# Patient Record
Sex: MALE | Race: WHITE | NOT HISPANIC OR LATINO | Employment: OTHER | URBAN - NONMETROPOLITAN AREA
[De-identification: names, ages, dates, MRNs, and addresses within clinical notes are randomized per-mention and may not be internally consistent; named-entity substitution may affect disease eponyms.]

---

## 2022-09-03 ENCOUNTER — HOSPITAL ENCOUNTER (INPATIENT)
Facility: HOSPITAL | Age: 42
LOS: 5 days | Discharge: NURSING, REHAB OR LONG-TERM CARE | End: 2022-09-08
Attending: STUDENT IN AN ORGANIZED HEALTH CARE EDUCATION/TRAINING PROGRAM | Admitting: INTERNAL MEDICINE

## 2022-09-03 ENCOUNTER — APPOINTMENT (OUTPATIENT)
Dept: ULTRASOUND IMAGING | Facility: HOSPITAL | Age: 42
End: 2022-09-03

## 2022-09-03 ENCOUNTER — APPOINTMENT (OUTPATIENT)
Dept: CT IMAGING | Facility: HOSPITAL | Age: 42
End: 2022-09-03

## 2022-09-03 DIAGNOSIS — L02.415 CELLULITIS AND ABSCESS OF RIGHT LOWER EXTREMITY: ICD-10-CM

## 2022-09-03 DIAGNOSIS — L02.91 PURULENT ABSCESS: Primary | ICD-10-CM

## 2022-09-03 DIAGNOSIS — F19.90 IV DRUG USER: ICD-10-CM

## 2022-09-03 DIAGNOSIS — L03.115 CELLULITIS AND ABSCESS OF RIGHT LOWER EXTREMITY: ICD-10-CM

## 2022-09-03 DIAGNOSIS — L02.619 ABSCESS OF FOOT: ICD-10-CM

## 2022-09-03 DIAGNOSIS — R00.0 SINUS TACHYCARDIA: ICD-10-CM

## 2022-09-03 LAB
ABO GROUP BLD: NORMAL
ALBUMIN SERPL-MCNC: 2.79 G/DL (ref 3.5–5.2)
ALBUMIN/GLOB SERPL: 0.5 G/DL
ALP SERPL-CCNC: 161 U/L (ref 39–117)
ALT SERPL W P-5'-P-CCNC: 24 U/L (ref 1–41)
AMPHET+METHAMPHET UR QL: NEGATIVE
AMPHETAMINES UR QL: NEGATIVE
ANION GAP SERPL CALCULATED.3IONS-SCNC: 11.8 MMOL/L (ref 5–15)
ANION GAP SERPL CALCULATED.3IONS-SCNC: 14.8 MMOL/L (ref 5–15)
APTT PPP: 32.5 SECONDS (ref 26.5–34.5)
AST SERPL-CCNC: 57 U/L (ref 1–40)
BARBITURATES UR QL SCN: NEGATIVE
BASOPHILS # BLD AUTO: 0.07 10*3/MM3 (ref 0–0.2)
BASOPHILS NFR BLD AUTO: 0.5 % (ref 0–1.5)
BENZODIAZ UR QL SCN: NEGATIVE
BILIRUB SERPL-MCNC: 1.9 MG/DL (ref 0–1.2)
BLD GP AB SCN SERPL QL: NEGATIVE
BUN SERPL-MCNC: 17 MG/DL (ref 6–20)
BUN SERPL-MCNC: 17 MG/DL (ref 6–20)
BUN/CREAT SERPL: 21 (ref 7–25)
BUN/CREAT SERPL: 21.5 (ref 7–25)
BUPRENORPHINE SERPL-MCNC: NEGATIVE NG/ML
CALCIUM SPEC-SCNC: 9 MG/DL (ref 8.6–10.5)
CALCIUM SPEC-SCNC: 9.7 MG/DL (ref 8.6–10.5)
CANNABINOIDS SERPL QL: NEGATIVE
CHLORIDE SERPL-SCNC: 88 MMOL/L (ref 98–107)
CHLORIDE SERPL-SCNC: 91 MMOL/L (ref 98–107)
CO2 SERPL-SCNC: 24.2 MMOL/L (ref 22–29)
CO2 SERPL-SCNC: 25.2 MMOL/L (ref 22–29)
COCAINE UR QL: NEGATIVE
CREAT SERPL-MCNC: 0.79 MG/DL (ref 0.76–1.27)
CREAT SERPL-MCNC: 0.81 MG/DL (ref 0.76–1.27)
CRP SERPL-MCNC: 23.79 MG/DL (ref 0–0.5)
D-LACTATE SERPL-SCNC: 2 MMOL/L (ref 0.5–2)
DEPRECATED RDW RBC AUTO: 43.8 FL (ref 37–54)
EGFRCR SERPLBLD CKD-EPI 2021: 114.3 ML/MIN/1.73
EGFRCR SERPLBLD CKD-EPI 2021: 115.2 ML/MIN/1.73
EOSINOPHIL # BLD AUTO: 0.03 10*3/MM3 (ref 0–0.4)
EOSINOPHIL NFR BLD AUTO: 0.2 % (ref 0.3–6.2)
ERYTHROCYTE [DISTWIDTH] IN BLOOD BY AUTOMATED COUNT: 13.2 % (ref 12.3–15.4)
ERYTHROCYTE [SEDIMENTATION RATE] IN BLOOD: 128 MM/HR (ref 0–15)
ETHANOL BLD-MCNC: <10 MG/DL (ref 0–10)
ETHANOL UR QL: <0.01 %
FLUAV RNA RESP QL NAA+PROBE: NOT DETECTED
FLUBV RNA RESP QL NAA+PROBE: NOT DETECTED
GLOBULIN UR ELPH-MCNC: 5.2 GM/DL
GLUCOSE SERPL-MCNC: 109 MG/DL (ref 65–99)
GLUCOSE SERPL-MCNC: 110 MG/DL (ref 65–99)
HAV IGM SERPL QL IA: ABNORMAL
HBV CORE IGM SERPL QL IA: ABNORMAL
HBV SURFACE AG SERPL QL IA: ABNORMAL
HCT VFR BLD AUTO: 40.1 % (ref 37.5–51)
HCV AB SER DONR QL: REACTIVE
HGB BLD-MCNC: 13.8 G/DL (ref 13–17.7)
HIV1+2 AB SER QL: NORMAL
HOLD SPECIMEN: NORMAL
HOLD SPECIMEN: NORMAL
IMM GRANULOCYTES # BLD AUTO: 0.15 10*3/MM3 (ref 0–0.05)
IMM GRANULOCYTES NFR BLD AUTO: 1 % (ref 0–0.5)
INR PPP: 1.15 (ref 0.9–1.1)
LYMPHOCYTES # BLD AUTO: 1.87 10*3/MM3 (ref 0.7–3.1)
LYMPHOCYTES NFR BLD AUTO: 13 % (ref 19.6–45.3)
MCH RBC QN AUTO: 31.2 PG (ref 26.6–33)
MCHC RBC AUTO-ENTMCNC: 34.4 G/DL (ref 31.5–35.7)
MCV RBC AUTO: 90.7 FL (ref 79–97)
METHADONE UR QL SCN: NEGATIVE
MONOCYTES # BLD AUTO: 1.37 10*3/MM3 (ref 0.1–0.9)
MONOCYTES NFR BLD AUTO: 9.5 % (ref 5–12)
NEUTROPHILS NFR BLD AUTO: 10.87 10*3/MM3 (ref 1.7–7)
NEUTROPHILS NFR BLD AUTO: 75.8 % (ref 42.7–76)
NRBC BLD AUTO-RTO: 0 /100 WBC (ref 0–0.2)
OPIATES UR QL: NEGATIVE
OXYCODONE UR QL SCN: POSITIVE
PCP UR QL SCN: NEGATIVE
PLAT MORPH BLD: NORMAL
PLATELET # BLD AUTO: 289 10*3/MM3 (ref 140–450)
PMV BLD AUTO: 10.4 FL (ref 6–12)
POTASSIUM SERPL-SCNC: 3.3 MMOL/L (ref 3.5–5.2)
POTASSIUM SERPL-SCNC: 3.3 MMOL/L (ref 3.5–5.2)
PROPOXYPH UR QL: NEGATIVE
PROT SERPL-MCNC: 8 G/DL (ref 6–8.5)
PROTHROMBIN TIME: 15 SECONDS (ref 12.1–14.7)
RBC # BLD AUTO: 4.42 10*6/MM3 (ref 4.14–5.8)
RBC MORPH BLD: NORMAL
RH BLD: POSITIVE
SARS-COV-2 RNA RESP QL NAA+PROBE: NOT DETECTED
SODIUM SERPL-SCNC: 127 MMOL/L (ref 136–145)
SODIUM SERPL-SCNC: 128 MMOL/L (ref 136–145)
T&S EXPIRATION DATE: NORMAL
TRICYCLICS UR QL SCN: NEGATIVE
WBC NRBC COR # BLD: 14.36 10*3/MM3 (ref 3.4–10.8)

## 2022-09-03 PROCEDURE — 86901 BLOOD TYPING SEROLOGIC RH(D): CPT | Performed by: STUDENT IN AN ORGANIZED HEALTH CARE EDUCATION/TRAINING PROGRAM

## 2022-09-03 PROCEDURE — 99285 EMERGENCY DEPT VISIT HI MDM: CPT

## 2022-09-03 PROCEDURE — 84300 ASSAY OF URINE SODIUM: CPT | Performed by: HOSPITALIST

## 2022-09-03 PROCEDURE — 86140 C-REACTIVE PROTEIN: CPT | Performed by: STUDENT IN AN ORGANIZED HEALTH CARE EDUCATION/TRAINING PROGRAM

## 2022-09-03 PROCEDURE — 87205 SMEAR GRAM STAIN: CPT | Performed by: STUDENT IN AN ORGANIZED HEALTH CARE EDUCATION/TRAINING PROGRAM

## 2022-09-03 PROCEDURE — 87070 CULTURE OTHR SPECIMN AEROBIC: CPT | Performed by: STUDENT IN AN ORGANIZED HEALTH CARE EDUCATION/TRAINING PROGRAM

## 2022-09-03 PROCEDURE — 93926 LOWER EXTREMITY STUDY: CPT

## 2022-09-03 PROCEDURE — 85610 PROTHROMBIN TIME: CPT | Performed by: STUDENT IN AN ORGANIZED HEALTH CARE EDUCATION/TRAINING PROGRAM

## 2022-09-03 PROCEDURE — 86850 RBC ANTIBODY SCREEN: CPT | Performed by: STUDENT IN AN ORGANIZED HEALTH CARE EDUCATION/TRAINING PROGRAM

## 2022-09-03 PROCEDURE — 25010000002 TETANUS-DIPHTH-ACELL PERTUSSIS 5-2.5-18.5 LF-MCG/0.5 SUSPENSION PREFILLED SYRINGE: Performed by: STUDENT IN AN ORGANIZED HEALTH CARE EDUCATION/TRAINING PROGRAM

## 2022-09-03 PROCEDURE — G0432 EIA HIV-1/HIV-2 SCREEN: HCPCS | Performed by: HOSPITALIST

## 2022-09-03 PROCEDURE — 93005 ELECTROCARDIOGRAM TRACING: CPT | Performed by: STUDENT IN AN ORGANIZED HEALTH CARE EDUCATION/TRAINING PROGRAM

## 2022-09-03 PROCEDURE — 87147 CULTURE TYPE IMMUNOLOGIC: CPT | Performed by: STUDENT IN AN ORGANIZED HEALTH CARE EDUCATION/TRAINING PROGRAM

## 2022-09-03 PROCEDURE — 87040 BLOOD CULTURE FOR BACTERIA: CPT | Performed by: STUDENT IN AN ORGANIZED HEALTH CARE EDUCATION/TRAINING PROGRAM

## 2022-09-03 PROCEDURE — 86900 BLOOD TYPING SEROLOGIC ABO: CPT | Performed by: STUDENT IN AN ORGANIZED HEALTH CARE EDUCATION/TRAINING PROGRAM

## 2022-09-03 PROCEDURE — 85025 COMPLETE CBC W/AUTO DIFF WBC: CPT | Performed by: STUDENT IN AN ORGANIZED HEALTH CARE EDUCATION/TRAINING PROGRAM

## 2022-09-03 PROCEDURE — 87186 SC STD MICRODIL/AGAR DIL: CPT | Performed by: STUDENT IN AN ORGANIZED HEALTH CARE EDUCATION/TRAINING PROGRAM

## 2022-09-03 PROCEDURE — 87636 SARSCOV2 & INF A&B AMP PRB: CPT | Performed by: STUDENT IN AN ORGANIZED HEALTH CARE EDUCATION/TRAINING PROGRAM

## 2022-09-03 PROCEDURE — 83735 ASSAY OF MAGNESIUM: CPT | Performed by: HOSPITALIST

## 2022-09-03 PROCEDURE — 25010000002 IOPAMIDOL 61 % SOLUTION: Performed by: STUDENT IN AN ORGANIZED HEALTH CARE EDUCATION/TRAINING PROGRAM

## 2022-09-03 PROCEDURE — 80074 ACUTE HEPATITIS PANEL: CPT | Performed by: HOSPITALIST

## 2022-09-03 PROCEDURE — 93010 ELECTROCARDIOGRAM REPORT: CPT | Performed by: INTERNAL MEDICINE

## 2022-09-03 PROCEDURE — 80306 DRUG TEST PRSMV INSTRMNT: CPT | Performed by: STUDENT IN AN ORGANIZED HEALTH CARE EDUCATION/TRAINING PROGRAM

## 2022-09-03 PROCEDURE — 87150 DNA/RNA AMPLIFIED PROBE: CPT | Performed by: STUDENT IN AN ORGANIZED HEALTH CARE EDUCATION/TRAINING PROGRAM

## 2022-09-03 PROCEDURE — 85652 RBC SED RATE AUTOMATED: CPT | Performed by: STUDENT IN AN ORGANIZED HEALTH CARE EDUCATION/TRAINING PROGRAM

## 2022-09-03 PROCEDURE — 99223 1ST HOSP IP/OBS HIGH 75: CPT | Performed by: HOSPITALIST

## 2022-09-03 PROCEDURE — 83605 ASSAY OF LACTIC ACID: CPT | Performed by: STUDENT IN AN ORGANIZED HEALTH CARE EDUCATION/TRAINING PROGRAM

## 2022-09-03 PROCEDURE — 90715 TDAP VACCINE 7 YRS/> IM: CPT | Performed by: STUDENT IN AN ORGANIZED HEALTH CARE EDUCATION/TRAINING PROGRAM

## 2022-09-03 PROCEDURE — 25010000002 PIPERACILLIN SOD-TAZOBACTAM PER 1 G: Performed by: STUDENT IN AN ORGANIZED HEALTH CARE EDUCATION/TRAINING PROGRAM

## 2022-09-03 PROCEDURE — 90471 IMMUNIZATION ADMIN: CPT | Performed by: STUDENT IN AN ORGANIZED HEALTH CARE EDUCATION/TRAINING PROGRAM

## 2022-09-03 PROCEDURE — 36415 COLL VENOUS BLD VENIPUNCTURE: CPT

## 2022-09-03 PROCEDURE — 73701 CT LOWER EXTREMITY W/DYE: CPT

## 2022-09-03 PROCEDURE — 80053 COMPREHEN METABOLIC PANEL: CPT | Performed by: STUDENT IN AN ORGANIZED HEALTH CARE EDUCATION/TRAINING PROGRAM

## 2022-09-03 PROCEDURE — 99284 EMERGENCY DEPT VISIT MOD MDM: CPT

## 2022-09-03 PROCEDURE — 82077 ASSAY SPEC XCP UR&BREATH IA: CPT | Performed by: STUDENT IN AN ORGANIZED HEALTH CARE EDUCATION/TRAINING PROGRAM

## 2022-09-03 PROCEDURE — 85007 BL SMEAR W/DIFF WBC COUNT: CPT | Performed by: STUDENT IN AN ORGANIZED HEALTH CARE EDUCATION/TRAINING PROGRAM

## 2022-09-03 PROCEDURE — 25010000002 VANCOMYCIN 1 G RECONSTITUTED SOLUTION 1 EACH VIAL: Performed by: STUDENT IN AN ORGANIZED HEALTH CARE EDUCATION/TRAINING PROGRAM

## 2022-09-03 PROCEDURE — 85730 THROMBOPLASTIN TIME PARTIAL: CPT | Performed by: STUDENT IN AN ORGANIZED HEALTH CARE EDUCATION/TRAINING PROGRAM

## 2022-09-03 RX ORDER — POTASSIUM CHLORIDE 1500 MG/1
40 TABLET, FILM COATED, EXTENDED RELEASE ORAL AS NEEDED
Status: DISCONTINUED | OUTPATIENT
Start: 2022-09-03 | End: 2022-09-08 | Stop reason: HOSPADM

## 2022-09-03 RX ORDER — SODIUM CHLORIDE 9 MG/ML
100 INJECTION, SOLUTION INTRAVENOUS CONTINUOUS
Status: DISCONTINUED | OUTPATIENT
Start: 2022-09-03 | End: 2022-09-03

## 2022-09-03 RX ORDER — NICOTINE 21 MG/24HR
1 PATCH, TRANSDERMAL 24 HOURS TRANSDERMAL
Status: DISCONTINUED | OUTPATIENT
Start: 2022-09-04 | End: 2022-09-08 | Stop reason: HOSPADM

## 2022-09-03 RX ORDER — MORPHINE SULFATE 2 MG/ML
1 INJECTION, SOLUTION INTRAMUSCULAR; INTRAVENOUS EVERY 4 HOURS PRN
Status: DISCONTINUED | OUTPATIENT
Start: 2022-09-03 | End: 2022-09-08 | Stop reason: HOSPADM

## 2022-09-03 RX ORDER — POTASSIUM CHLORIDE 1.5 G/1.77G
40 POWDER, FOR SOLUTION ORAL AS NEEDED
Status: DISCONTINUED | OUTPATIENT
Start: 2022-09-03 | End: 2022-09-08 | Stop reason: HOSPADM

## 2022-09-03 RX ORDER — SODIUM CHLORIDE 0.9 % (FLUSH) 0.9 %
10 SYRINGE (ML) INJECTION AS NEEDED
Status: DISCONTINUED | OUTPATIENT
Start: 2022-09-03 | End: 2022-09-08 | Stop reason: HOSPADM

## 2022-09-03 RX ADMIN — VANCOMYCIN HYDROCHLORIDE 1 G: 1 INJECTION, POWDER, LYOPHILIZED, FOR SOLUTION INTRAVENOUS at 18:15

## 2022-09-03 RX ADMIN — IOPAMIDOL 85 ML: 612 INJECTION, SOLUTION INTRAVENOUS at 19:42

## 2022-09-03 RX ADMIN — PIPERACILLIN SODIUM AND TAZOBACTAM SODIUM 3.38 G: 3; .375 INJECTION, POWDER, LYOPHILIZED, FOR SOLUTION INTRAVENOUS at 18:00

## 2022-09-03 RX ADMIN — TETANUS TOXOID, REDUCED DIPHTHERIA TOXOID AND ACELLULAR PERTUSSIS VACCINE, ADSORBED 0.5 ML: 5; 2.5; 8; 8; 2.5 SUSPENSION INTRAMUSCULAR at 18:20

## 2022-09-03 RX ADMIN — SODIUM CHLORIDE, POTASSIUM CHLORIDE, SODIUM LACTATE AND CALCIUM CHLORIDE 1000 ML: 600; 310; 30; 20 INJECTION, SOLUTION INTRAVENOUS at 17:21
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
## 2022-09-04 ENCOUNTER — APPOINTMENT (OUTPATIENT)
Dept: CARDIOLOGY | Facility: HOSPITAL | Age: 42
End: 2022-09-04

## 2022-09-04 LAB
ABO GROUP BLD: NORMAL
ALBUMIN SERPL-MCNC: 1.86 G/DL (ref 3.5–5.2)
ALBUMIN/GLOB SERPL: 0.4 G/DL
ALP SERPL-CCNC: 161 U/L (ref 39–117)
ALT SERPL W P-5'-P-CCNC: 18 U/L (ref 1–41)
ANION GAP SERPL CALCULATED.3IONS-SCNC: 13.6 MMOL/L (ref 5–15)
ANION GAP SERPL CALCULATED.3IONS-SCNC: 7.9 MMOL/L (ref 5–15)
AST SERPL-CCNC: 38 U/L (ref 1–40)
BACTERIA BLD CULT: ABNORMAL
BASOPHILS # BLD AUTO: 0.04 10*3/MM3 (ref 0–0.2)
BASOPHILS NFR BLD AUTO: 0.3 % (ref 0–1.5)
BH CV ECHO MEAS - ACS: 2.4 CM
BH CV ECHO MEAS - AO MAX PG: 4.8 MMHG
BH CV ECHO MEAS - AO MEAN PG: 2 MMHG
BH CV ECHO MEAS - AO ROOT DIAM: 3.2 CM
BH CV ECHO MEAS - AO V2 MAX: 109 CM/SEC
BH CV ECHO MEAS - AO V2 VTI: 22.1 CM
BH CV ECHO MEAS - EDV(CUBED): 161 ML
BH CV ECHO MEAS - EDV(MOD-SP4): 81.3 ML
BH CV ECHO MEAS - EF(MOD-SP4): 49.9 %
BH CV ECHO MEAS - ESV(CUBED): 56.2 ML
BH CV ECHO MEAS - ESV(MOD-SP4): 40.7 ML
BH CV ECHO MEAS - FS: 29.6 %
BH CV ECHO MEAS - IVS/LVPW: 0.72 CM
BH CV ECHO MEAS - IVSD: 0.68 CM
BH CV ECHO MEAS - LA DIMENSION: 2.9 CM
BH CV ECHO MEAS - LAT PEAK E' VEL: 15 CM/SEC
BH CV ECHO MEAS - LV MASS(C)D: 161.6 GRAMS
BH CV ECHO MEAS - LVIDD: 5.4 CM
BH CV ECHO MEAS - LVIDS: 3.8 CM
BH CV ECHO MEAS - LVOT AREA: 4.2 CM2
BH CV ECHO MEAS - LVOT DIAM: 2.3 CM
BH CV ECHO MEAS - LVPWD: 0.95 CM
BH CV ECHO MEAS - MED PEAK E' VEL: 10.9 CM/SEC
BH CV ECHO MEAS - MV A MAX VEL: 69.2 CM/SEC
BH CV ECHO MEAS - MV E MAX VEL: 64.9 CM/SEC
BH CV ECHO MEAS - MV E/A: 0.94
BH CV ECHO MEAS - PA ACC TIME: 0.1 SEC
BH CV ECHO MEAS - PA PR(ACCEL): 34.4 MMHG
BH CV ECHO MEAS - RAP SYSTOLE: 10 MMHG
BH CV ECHO MEAS - RVSP: 32.1 MMHG
BH CV ECHO MEAS - SV(MOD-SP4): 40.6 ML
BH CV ECHO MEAS - TAPSE (>1.6): 2.7 CM
BH CV ECHO MEAS - TR MAX PG: 22.1 MMHG
BH CV ECHO MEAS - TR MAX VEL: 235 CM/SEC
BH CV ECHO MEASUREMENTS AVERAGE E/E' RATIO: 5.01
BILIRUB SERPL-MCNC: 1.5 MG/DL (ref 0–1.2)
BOTTLE TYPE: ABNORMAL
BUN SERPL-MCNC: 16 MG/DL (ref 6–20)
BUN SERPL-MCNC: 17 MG/DL (ref 6–20)
BUN/CREAT SERPL: 18.6 (ref 7–25)
BUN/CREAT SERPL: 20 (ref 7–25)
CALCIUM SPEC-SCNC: 8.7 MG/DL (ref 8.6–10.5)
CALCIUM SPEC-SCNC: 9.1 MG/DL (ref 8.6–10.5)
CHLORIDE SERPL-SCNC: 91 MMOL/L (ref 98–107)
CHLORIDE SERPL-SCNC: 97 MMOL/L (ref 98–107)
CO2 SERPL-SCNC: 26.1 MMOL/L (ref 22–29)
CO2 SERPL-SCNC: 26.4 MMOL/L (ref 22–29)
CREAT SERPL-MCNC: 0.85 MG/DL (ref 0.76–1.27)
CREAT SERPL-MCNC: 0.86 MG/DL (ref 0.76–1.27)
CRP SERPL-MCNC: 16.74 MG/DL (ref 0–0.5)
DEPRECATED RDW RBC AUTO: 44 FL (ref 37–54)
EGFRCR SERPLBLD CKD-EPI 2021: 112.3 ML/MIN/1.73
EGFRCR SERPLBLD CKD-EPI 2021: 112.7 ML/MIN/1.73
EOSINOPHIL # BLD AUTO: 0.03 10*3/MM3 (ref 0–0.4)
EOSINOPHIL NFR BLD AUTO: 0.2 % (ref 0.3–6.2)
ERYTHROCYTE [DISTWIDTH] IN BLOOD BY AUTOMATED COUNT: 13.2 % (ref 12.3–15.4)
GLOBULIN UR ELPH-MCNC: 4.9 GM/DL
GLUCOSE SERPL-MCNC: 107 MG/DL (ref 65–99)
GLUCOSE SERPL-MCNC: 111 MG/DL (ref 65–99)
HCT VFR BLD AUTO: 39.3 % (ref 37.5–51)
HGB BLD-MCNC: 13.5 G/DL (ref 13–17.7)
IMM GRANULOCYTES # BLD AUTO: 0.17 10*3/MM3 (ref 0–0.05)
IMM GRANULOCYTES NFR BLD AUTO: 1.2 % (ref 0–0.5)
LEFT ATRIUM VOLUME INDEX: 13 ML/M2
LV EF 2D ECHO EST: 65 %
LYMPHOCYTES # BLD AUTO: 2.13 10*3/MM3 (ref 0.7–3.1)
LYMPHOCYTES NFR BLD AUTO: 14.8 % (ref 19.6–45.3)
MAGNESIUM SERPL-MCNC: 1.9 MG/DL (ref 1.6–2.6)
MAXIMAL PREDICTED HEART RATE: 180 BPM
MCH RBC QN AUTO: 31 PG (ref 26.6–33)
MCHC RBC AUTO-ENTMCNC: 34.4 G/DL (ref 31.5–35.7)
MCV RBC AUTO: 90.3 FL (ref 79–97)
MONOCYTES # BLD AUTO: 1.32 10*3/MM3 (ref 0.1–0.9)
MONOCYTES NFR BLD AUTO: 9.2 % (ref 5–12)
MRSA DNA SPEC QL NAA+PROBE: POSITIVE
NEUTROPHILS NFR BLD AUTO: 10.68 10*3/MM3 (ref 1.7–7)
NEUTROPHILS NFR BLD AUTO: 74.3 % (ref 42.7–76)
NRBC BLD AUTO-RTO: 0 /100 WBC (ref 0–0.2)
PLAT MORPH BLD: NORMAL
PLATELET # BLD AUTO: 287 10*3/MM3 (ref 140–450)
PMV BLD AUTO: 10.5 FL (ref 6–12)
POTASSIUM SERPL-SCNC: 3.1 MMOL/L (ref 3.5–5.2)
POTASSIUM SERPL-SCNC: 4 MMOL/L (ref 3.5–5.2)
POTASSIUM SERPL-SCNC: 4 MMOL/L (ref 3.5–5.2)
PROT SERPL-MCNC: 6.8 G/DL (ref 6–8.5)
QT INTERVAL: 378 MS
QTC INTERVAL: 487 MS
RBC # BLD AUTO: 4.35 10*6/MM3 (ref 4.14–5.8)
RBC MORPH BLD: NORMAL
RH BLD: POSITIVE
S AUREUS DNA SPEC QL NAA+PROBE: POSITIVE
SODIUM SERPL-SCNC: 131 MMOL/L (ref 136–145)
SODIUM SERPL-SCNC: 131 MMOL/L (ref 136–145)
SODIUM UR-SCNC: <20 MMOL/L
STRESS TARGET HR: 153 BPM
WBC NRBC COR # BLD: 14.37 10*3/MM3 (ref 3.4–10.8)

## 2022-09-04 PROCEDURE — 87640 STAPH A DNA AMP PROBE: CPT | Performed by: HOSPITALIST

## 2022-09-04 PROCEDURE — 87147 CULTURE TYPE IMMUNOLOGIC: CPT | Performed by: NURSE PRACTITIONER

## 2022-09-04 PROCEDURE — 93306 TTE W/DOPPLER COMPLETE: CPT | Performed by: INTERNAL MEDICINE

## 2022-09-04 PROCEDURE — 87641 MR-STAPH DNA AMP PROBE: CPT | Performed by: HOSPITALIST

## 2022-09-04 PROCEDURE — 25010000002 MORPHINE PER 10 MG: Performed by: HOSPITALIST

## 2022-09-04 PROCEDURE — 25010000002 HEPARIN (PORCINE) PER 1000 UNITS: Performed by: HOSPITALIST

## 2022-09-04 PROCEDURE — 99232 SBSQ HOSP IP/OBS MODERATE 35: CPT | Performed by: HOSPITALIST

## 2022-09-04 PROCEDURE — 93306 TTE W/DOPPLER COMPLETE: CPT

## 2022-09-04 PROCEDURE — 85007 BL SMEAR W/DIFF WBC COUNT: CPT | Performed by: HOSPITALIST

## 2022-09-04 PROCEDURE — 84132 ASSAY OF SERUM POTASSIUM: CPT | Performed by: HOSPITALIST

## 2022-09-04 PROCEDURE — 99221 1ST HOSP IP/OBS SF/LOW 40: CPT | Performed by: SURGERY

## 2022-09-04 PROCEDURE — 80053 COMPREHEN METABOLIC PANEL: CPT | Performed by: HOSPITALIST

## 2022-09-04 PROCEDURE — 85025 COMPLETE CBC W/AUTO DIFF WBC: CPT | Performed by: HOSPITALIST

## 2022-09-04 PROCEDURE — 86900 BLOOD TYPING SEROLOGIC ABO: CPT

## 2022-09-04 PROCEDURE — 25010000002 VANCOMYCIN 1 G RECONSTITUTED SOLUTION 1 EACH VIAL: Performed by: HOSPITALIST

## 2022-09-04 PROCEDURE — 25010000002 PIPERACILLIN SOD-TAZOBACTAM PER 1 G: Performed by: HOSPITALIST

## 2022-09-04 PROCEDURE — 87040 BLOOD CULTURE FOR BACTERIA: CPT | Performed by: NURSE PRACTITIONER

## 2022-09-04 PROCEDURE — 86140 C-REACTIVE PROTEIN: CPT | Performed by: HOSPITALIST

## 2022-09-04 PROCEDURE — 86901 BLOOD TYPING SEROLOGIC RH(D): CPT

## 2022-09-04 PROCEDURE — 99221 1ST HOSP IP/OBS SF/LOW 40: CPT | Performed by: NURSE PRACTITIONER

## 2022-09-04 RX ORDER — POTASSIUM CHLORIDE 20 MEQ/1
40 TABLET, EXTENDED RELEASE ORAL EVERY 4 HOURS
Status: COMPLETED | OUTPATIENT
Start: 2022-09-04 | End: 2022-09-04

## 2022-09-04 RX ORDER — HYDROXYZINE HYDROCHLORIDE 25 MG/1
25 TABLET, FILM COATED ORAL ONCE
Status: COMPLETED | OUTPATIENT
Start: 2022-09-04 | End: 2022-09-04

## 2022-09-04 RX ORDER — SODIUM CHLORIDE 0.9 % (FLUSH) 0.9 %
10 SYRINGE (ML) INJECTION AS NEEDED
Status: DISCONTINUED | OUTPATIENT
Start: 2022-09-04 | End: 2022-09-08 | Stop reason: HOSPADM

## 2022-09-04 RX ORDER — SODIUM CHLORIDE 9 MG/ML
125 INJECTION, SOLUTION INTRAVENOUS CONTINUOUS
Status: DISCONTINUED | OUTPATIENT
Start: 2022-09-04 | End: 2022-09-08 | Stop reason: HOSPADM

## 2022-09-04 RX ORDER — SODIUM CHLORIDE 0.9 % (FLUSH) 0.9 %
10 SYRINGE (ML) INJECTION EVERY 12 HOURS SCHEDULED
Status: DISCONTINUED | OUTPATIENT
Start: 2022-09-04 | End: 2022-09-08 | Stop reason: HOSPADM

## 2022-09-04 RX ORDER — HEPARIN SODIUM 5000 [USP'U]/ML
5000 INJECTION, SOLUTION INTRAVENOUS; SUBCUTANEOUS EVERY 12 HOURS SCHEDULED
Status: DISCONTINUED | OUTPATIENT
Start: 2022-09-04 | End: 2022-09-08 | Stop reason: HOSPADM

## 2022-09-04 RX ADMIN — NICOTINE 1 PATCH: 14 PATCH TRANSDERMAL at 00:43

## 2022-09-04 RX ADMIN — Medication 10 ML: at 09:00

## 2022-09-04 RX ADMIN — VANCOMYCIN HYDROCHLORIDE 1000 MG: 1 INJECTION, POWDER, LYOPHILIZED, FOR SOLUTION INTRAVENOUS at 17:37

## 2022-09-04 RX ADMIN — MORPHINE SULFATE 1 MG: 2 INJECTION, SOLUTION INTRAMUSCULAR; INTRAVENOUS at 05:26

## 2022-09-04 RX ADMIN — POTASSIUM CHLORIDE 40 MEQ: 20 TABLET, EXTENDED RELEASE ORAL at 14:06

## 2022-09-04 RX ADMIN — VANCOMYCIN HYDROCHLORIDE 1000 MG: 1 INJECTION, POWDER, LYOPHILIZED, FOR SOLUTION INTRAVENOUS at 10:09

## 2022-09-04 RX ADMIN — HEPARIN SODIUM 5000 UNITS: 5000 INJECTION INTRAVENOUS; SUBCUTANEOUS at 00:42

## 2022-09-04 RX ADMIN — Medication 10 ML: at 00:42

## 2022-09-04 RX ADMIN — MORPHINE SULFATE 1 MG: 2 INJECTION, SOLUTION INTRAMUSCULAR; INTRAVENOUS at 21:37

## 2022-09-04 RX ADMIN — SODIUM CHLORIDE 125 ML/HR: 9 INJECTION, SOLUTION INTRAVENOUS at 17:50

## 2022-09-04 RX ADMIN — HEPARIN SODIUM 5000 UNITS: 5000 INJECTION INTRAVENOUS; SUBCUTANEOUS at 21:36

## 2022-09-04 RX ADMIN — PIPERACILLIN SODIUM AND TAZOBACTAM SODIUM 3.38 G: 3; .375 INJECTION, POWDER, LYOPHILIZED, FOR SOLUTION INTRAVENOUS at 02:35

## 2022-09-04 RX ADMIN — POTASSIUM CHLORIDE 40 MEQ: 20 TABLET, EXTENDED RELEASE ORAL at 05:11

## 2022-09-04 RX ADMIN — Medication 10 ML: at 21:36

## 2022-09-04 RX ADMIN — MORPHINE SULFATE 1 MG: 2 INJECTION, SOLUTION INTRAMUSCULAR; INTRAVENOUS at 00:42

## 2022-09-04 RX ADMIN — HYDROXYZINE HYDROCHLORIDE 25 MG: 25 TABLET ORAL at 21:36

## 2022-09-04 RX ADMIN — ETHYL ALCOHOL 1 EACH: 62 SWAB TOPICAL at 21:37

## 2022-09-04 RX ADMIN — VANCOMYCIN HYDROCHLORIDE 1000 MG: 1 INJECTION, POWDER, LYOPHILIZED, FOR SOLUTION INTRAVENOUS at 01:28

## 2022-09-04 RX ADMIN — POTASSIUM CHLORIDE 40 MEQ: 20 TABLET, EXTENDED RELEASE ORAL at 10:09

## 2022-09-04 RX ADMIN — SODIUM CHLORIDE 125 ML/HR: 9 INJECTION, SOLUTION INTRAVENOUS at 05:11

## 2022-09-04 RX ADMIN — MORPHINE SULFATE 1 MG: 2 INJECTION, SOLUTION INTRAMUSCULAR; INTRAVENOUS at 10:21

## 2022-09-04 RX ADMIN — PIPERACILLIN SODIUM AND TAZOBACTAM SODIUM 3.38 G: 3; .375 INJECTION, POWDER, LYOPHILIZED, FOR SOLUTION INTRAVENOUS at 17:35

## 2022-09-04 RX ADMIN — PIPERACILLIN SODIUM AND TAZOBACTAM SODIUM 3.38 G: 3; .375 INJECTION, POWDER, LYOPHILIZED, FOR SOLUTION INTRAVENOUS at 09:00

## 2022-09-04 RX ADMIN — MORPHINE SULFATE 1 MG: 2 INJECTION, SOLUTION INTRAMUSCULAR; INTRAVENOUS at 14:06

## 2022-09-04 RX ADMIN — MORPHINE SULFATE 1 MG: 2 INJECTION, SOLUTION INTRAMUSCULAR; INTRAVENOUS at 18:01
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
## 2022-09-04 NOTE — PROGRESS NOTES
Pharmacy was consulted to dose zosyn and vancomycin for sepsis and a skin and soft tissue infection. Based on an estimated CrCl of greater than 120mL/min, an extended infusion zosyn dose of 3.375g q8hr and vancomycin 1g q8hr has been ordered. A vancomycin trough will be obtained at steady state to determine if we are within the target therapeutic AUC concentration of 400-600mg/L.hr. Thank you for the consult. Pharmacy will continue to follow.     Thank you,   Tara Rodriguez, PharmD  00:55 EDT
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
## 2022-09-04 NOTE — PLAN OF CARE
Goal Outcome Evaluation:  Plan of Care Reviewed With: patient        Progress: no change  Outcome Evaluation: Pt admitted this shift. PRN pain meds given. Cont IV abx. Potassium 3.1- replacement dose requested. No other acute changes noted. Will continue POC
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
## 2022-09-04 NOTE — PLAN OF CARE
Goal Outcome Evaluation:  Plan of Care Reviewed With: patient        Progress: no change  Outcome Evaluation: Patient's VSS. Pt requested PRN pain medication throughout the shift. Pt advanced from NPO to regular diet, but will be NPO at midnight. Patient voiced no further concerns at this time.

## 2022-09-04 NOTE — PAYOR COMM NOTE
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 were made to your prescriptions during this visit.          Salina Strickland DO  09/03/22 2345      Electronically signed by Salina Strickland DO at 09/03/22 2345         Current Facility-Administered Medications   Medication Dose Route Frequency Provider Last Rate Last Admin   • heparin (porcine) 5000 UNIT/ML injection 5,000 Units  5,000 Units Subcutaneous Q12H Timbo Arellano MD   5,000 Units at 09/04/22 0042   • morphine injection 1 mg  1 mg Intravenous Q4H PRN Timbo Arellano MD   1 mg at 09/04/22 1021   • nicotine (NICODERM CQ) 14 MG/24HR patch 1 patch  1 patch Transdermal Q24H Timbo Arellano MD   1 patch at 09/04/22 0043   • piperacillin-tazobactam (ZOSYN) IVPB 3.375 g in 100 mL NS VTB  3.375 g Intravenous Q8H Timbo Arellano MD   3.375 g at 09/04/22 0900   • potassium chloride (K-DUR,KLOR-CON) CR tablet 40 mEq  40 mEq Oral Q4H Timbo Arellano MD   40 mEq at 09/04/22 1009   • potassium chloride (K-DUR,KLOR-CON) ER tablet 40 mEq  40 mEq Oral PRN Timbo Arellano MD       • potassium chloride (KLOR-CON) packet 40 mEq  40 mEq Oral PRN Timbo Arellano MD       • sodium chloride 0.9 % flush 10  mL  10 mL Intravenous PRN Timbo Green MD       • sodium chloride 0.9 % flush 10 mL  10 mL Intravenous Q12H Timbo Green MD   10 mL at 09/04/22 0900   • sodium chloride 0.9 % flush 10 mL  10 mL Intravenous PRN Timbo Green MD       • sodium chloride 0.9 % infusion  125 mL/hr Intravenous Continuous Timbo Green  mL/hr at 09/04/22 0511 125 mL/hr at 09/04/22 0511   • vancomycin (VANCOCIN) 1,000 mg in sodium chloride 0.9 % 250 mL IVPB  1,000 mg Intravenous Q8H Timbo Green MD   1,000 mg at 09/04/22 1009       Lab Results (last 24 hours)     Procedure Component Value Units Date/Time    Basic Metabolic Panel [[id number]] Collected: 09/04/22 1037    Specimen: Blood Updated: 09/04/22 1047    MRSA Screen, PCR (Inpatient) - Swab, Nares [[id number]] Collected: 09/04/22 1010    Specimen: Swab from Nares Updated: 09/04/22 1035    Blood Culture - Blood, Arm, Right [[id number]]  (Abnormal) Collected: 09/03/22 1710    Specimen: Blood from Arm, Right Updated: 09/04/22 0915     Blood Culture Abnormal Stain     Gram Stain Anaerobic Bottle Gram positive cocci in clusters      Aerobic Bottle Gram positive cocci in clusters    Narrative:      No BCID performed, refer to previous blood culture specimen.      Blood Culture - Blood, Hand, Right [[id number]]  (Abnormal) Collected: 09/03/22 1720    Specimen: Blood from Hand, Right Updated: 09/04/22 0915     Blood Culture Abnormal Stain     Gram Stain Aerobic Bottle Gram positive cocci in clusters      Anaerobic Bottle Gram positive cocci in clusters    Blood Culture ID, PCR - Blood, Hand, Right [[id number]]  (Abnormal) Collected: 09/03/22 1720    Specimen: Blood from Hand, Right Updated: 09/04/22 0915     BCID, PCR Staph aureus. mecA/C and MREJ (methicillin resistance gene) detected. Identification by BCID2 PCR.     BOTTLE TYPE Aerobic Bottle    Narrative:      Infectious disease consultation is highly recommended to rule out distant foci of  infection.    Scan Slide [[id number]]  (Normal) Collected: 09/04/22 0253    Specimen: Blood Updated: 09/04/22 0538     RBC Morphology Normal     Platelet Morphology Normal    CBC Auto Differential [[id number]]  (Abnormal) Collected: 09/04/22 0253    Specimen: Blood Updated: 09/04/22 0538     WBC 14.37 10*3/mm3      RBC 4.35 10*6/mm3      Hemoglobin 13.5 g/dL      Hematocrit 39.3 %      MCV 90.3 fL      MCH 31.0 pg      MCHC 34.4 g/dL      RDW 13.2 %      RDW-SD 44.0 fl      MPV 10.5 fL      Platelets 287 10*3/mm3      Neutrophil % 74.3 %      Lymphocyte % 14.8 %      Monocyte % 9.2 %      Eosinophil % 0.2 %      Basophil % 0.3 %      Immature Grans % 1.2 %      Neutrophils, Absolute 10.68 10*3/mm3      Lymphocytes, Absolute 2.13 10*3/mm3      Monocytes, Absolute 1.32 10*3/mm3      Eosinophils, Absolute 0.03 10*3/mm3      Basophils, Absolute 0.04 10*3/mm3      Immature Grans, Absolute 0.17 10*3/mm3      nRBC 0.0 /100 WBC     Comprehensive Metabolic Panel [[id number]]  (Abnormal) Collected: 09/04/22 0253    Specimen: Blood Updated: 09/04/22 0322     Glucose 107 mg/dL      BUN 17 mg/dL      Creatinine 0.85 mg/dL      Sodium 131 mmol/L      Potassium 3.1 mmol/L      Chloride 91 mmol/L      CO2 26.4 mmol/L      Calcium 8.7 mg/dL      Total Protein 6.8 g/dL      Albumin 1.86 g/dL      ALT (SGPT) 18 U/L      AST (SGOT) 38 U/L      Alkaline Phosphatase 161 U/L      Total Bilirubin 1.5 mg/dL      Globulin 4.9 gm/dL      A/G Ratio 0.4 g/dL      BUN/Creatinine Ratio 20.0     Anion Gap 13.6 mmol/L      eGFR 112.7 mL/min/1.73      Comment: National Kidney Foundation and American Society of Nephrology (ASN) Task Force recommended calculation based on the Chronic Kidney Disease Epidemiology Collaboration (CKD-EPI) equation refit without adjustment for race.       Narrative:      GFR Normal >60  Chronic Kidney Disease <60  Kidney Failure <15      C-reactive Protein [[id number]]  (Abnormal) Collected: 09/04/22 0253    Specimen: Blood  Updated: 09/04/22 0322     C-Reactive Protein 16.74 mg/dL     Sodium, Urine, Random - Urine, Clean Catch [[id number]] Collected: 09/03/22 1802    Specimen: Urine, Clean Catch Updated: 09/04/22 0008     Sodium, Urine <20 mmol/L     Narrative:      Reference intervals for random urine have not been established.  Clinical usage is dependent upon physician's interpretation in combination with other laboratory tests.       Magnesium [[id number]]  (Normal) Collected: 09/03/22 2210    Specimen: Blood Updated: 09/04/22 0005     Magnesium 1.9 mg/dL     HIV-1 / O / 2 Ag / Antibody 4th Generation [[id number]]  (Normal) Collected: 09/03/22 2210    Specimen: Blood Updated: 09/03/22 2302     HIV-1/ HIV-2 Non-Reactive     Comment: A non-reactive test result does not preclude the possibility of exposure to HIV or infection with HIV. An antibody response to recent exposure may take several months to reach detectable levels.       Narrative:      The HIV antibody/antigen combo assay is a qualitative assay for HIV that includes the p24 antigen as well as antibodies to HIV types 1 and 2. This test is intended to be used as a screening assay in the diagnosis of HIV infection in patients over the age of 2.    Hepatitis Panel, Acute [[id number]]  (Abnormal) Collected: 09/03/22 1710    Specimen: Blood from Arm, Right Updated: 09/03/22 2253     Hepatitis B Surface Ag Non-Reactive     Hep A IgM Non-Reactive     Hep B C IgM Non-Reactive     Hepatitis C Ab Reactive    Narrative:      Results may be falsely decreased if patient taking Biotin.     Basic Metabolic Panel [[id number]]  (Abnormal) Collected: 09/03/22 2210    Specimen: Blood Updated: 09/03/22 2231     Glucose 109 mg/dL      BUN 17 mg/dL      Creatinine 0.79 mg/dL      Sodium 127 mmol/L      Potassium 3.3 mmol/L      Comment: Slight hemolysis detected by analyzer. Results may be affected.        Chloride 91 mmol/L      CO2 24.2 mmol/L      Calcium 9.0 mg/dL      BUN/Creatinine Ratio 21.5      Anion Gap 11.8 mmol/L      eGFR 115.2 mL/min/1.73      Comment: National Kidney Foundation and American Society of Nephrology (ASN) Task Force recommended calculation based on the Chronic Kidney Disease Epidemiology Collaboration (CKD-EPI) equation refit without adjustment for race.       Narrative:      GFR Normal >60  Chronic Kidney Disease <60  Kidney Failure <15      CBC & Differential [[id number]]  (Abnormal) Collected: 09/03/22 1942    Specimen: Blood Updated: 09/03/22 2032    Narrative:      The following orders were created for panel order CBC & Differential.  Procedure                               Abnormality         Status                     ---------                               -----------         ------                     CBC Auto Differential[[id number]]        Abnormal            Final result               Scan Slide[[id number]]                   Normal              Final result                 Please view results for these tests on the individual orders.    Scan Slide [[id number]]  (Normal) Collected: 09/03/22 1942    Specimen: Blood Updated: 09/03/22 2032     RBC Morphology Normal     Platelet Morphology Normal    CBC Auto Differential [[id number]]  (Abnormal) Collected: 09/03/22 1942    Specimen: Blood Updated: 09/03/22 2032     WBC 14.36 10*3/mm3      RBC 4.42 10*6/mm3      Hemoglobin 13.8 g/dL      Hematocrit 40.1 %      MCV 90.7 fL      MCH 31.2 pg      MCHC 34.4 g/dL      RDW 13.2 %      RDW-SD 43.8 fl      MPV 10.4 fL      Platelets 289 10*3/mm3      Neutrophil % 75.8 %      Lymphocyte % 13.0 %      Monocyte % 9.5 %      Eosinophil % 0.2 %      Basophil % 0.5 %      Immature Grans % 1.0 %      Neutrophils, Absolute 10.87 10*3/mm3      Lymphocytes, Absolute 1.87 10*3/mm3      Monocytes, Absolute 1.37 10*3/mm3      Eosinophils, Absolute 0.03 10*3/mm3      Basophils, Absolute 0.07 10*3/mm3      Immature Grans, Absolute 0.15 10*3/mm3      nRBC 0.0 /100 WBC     Comprehensive Metabolic Panel  [[id number]]  (Abnormal) Collected: 09/03/22 1710    Specimen: Blood from Arm, Right Updated: 09/03/22 1857     Glucose 110 mg/dL      BUN 17 mg/dL      Creatinine 0.81 mg/dL      Sodium 128 mmol/L      Potassium 3.3 mmol/L      Chloride 88 mmol/L      CO2 25.2 mmol/L      Calcium 9.7 mg/dL      Total Protein 8.0 g/dL      Albumin 2.79 g/dL      ALT (SGPT) 24 U/L      AST (SGOT) 57 U/L      Alkaline Phosphatase 161 U/L      Total Bilirubin 1.9 mg/dL      Globulin 5.2 gm/dL      A/G Ratio 0.5 g/dL      BUN/Creatinine Ratio 21.0     Anion Gap 14.8 mmol/L      eGFR 114.3 mL/min/1.73      Comment: National Kidney Foundation and American Society of Nephrology (ASN) Task Force recommended calculation based on the Chronic Kidney Disease Epidemiology Collaboration (CKD-EPI) equation refit without adjustment for race.       Narrative:      GFR Normal >60  Chronic Kidney Disease <60  Kidney Failure <15      Wound Culture - Wound, Foot, Right [[id number]] Collected: 09/03/22 1719    Specimen: Wound from Foot, Right Updated: 09/03/22 1838     Gram Stain Rare (1+) WBCs seen      Few (2+) Gram positive cocci in pairs and clusters      Rare (1+) Gram negative cocci    Urine Drug Screen - Urine, Clean Catch [[id number]]  (Abnormal) Collected: 09/03/22 1802    Specimen: Urine, Clean Catch Updated: 09/03/22 1829     THC, Screen, Urine Negative     Phencyclidine (PCP), Urine Negative     Cocaine Screen, Urine Negative     Methamphetamine, Ur Negative     Opiate Screen Negative     Amphetamine Screen, Urine Negative     Benzodiazepine Screen, Urine Negative     Tricyclic Antidepressants Screen Negative     Methadone Screen, Urine Negative     Barbiturates Screen, Urine Negative     Oxycodone Screen, Urine Positive     Propoxyphene Screen Negative     Buprenorphine, Screen, Urine Negative    Narrative:      Cutoff For Drugs Screened:    Amphetamines               500 ng/ml  Barbiturates               200 ng/ml  Benzodiazepines             150 ng/ml  Cocaine                    150 ng/ml  Methadone                  200 ng/ml  Opiates                    100 ng/ml  Phencyclidine               25 ng/ml  THC                            50 ng/ml  Methamphetamine            500 ng/ml  Tricyclic Antidepressants  300 ng/ml  Oxycodone                  100 ng/ml  Propoxyphene               300 ng/ml  Buprenorphine               10 ng/ml    The normal value for all drugs tested is negative. This report includes unconfirmed screening results, with the cutoff values listed, to be used for medical treatment purposes only.  Unconfirmed results must not be used for non-medical purposes such as employment or legal testing.  Clinical consideration should be applied to any drug of abuse test, particularly when unconfirmed results are used.      Arma Draw [[id number]] Collected: 09/03/22 1710    Specimen: Blood from Arm, Right Updated: 09/03/22 1818    Narrative:      The following orders were created for panel order Arma Draw.  Procedure                               Abnormality         Status                     ---------                               -----------         ------                     Green Top (Gel)[[id number]]                                  Final result               Lavender Top[[id number]]                                                                Gold Top - SST[[id number]]                                   Final result               Light Blue Top[[id number]]                                                                Please view results for these tests on the individual orders.    Gold Top - SST [[id number]] Collected: 09/03/22 1710    Specimen: Blood from Arm, Right Updated: 09/03/22 1818     Extra Tube Hold for add-ons.     Comment: Auto resulted.       Green Top (Gel) [[id number]] Collected: 09/03/22 1710    Specimen: Blood from Arm, Right Updated: 09/03/22 1818     Extra Tube Hold for add-ons.     Comment: Auto resulted.       Protime-INR  [[id number]]  (Abnormal) Collected: 09/03/22 1710    Specimen: Blood from Arm, Right Updated: 09/03/22 1802     Protime 15.0 Seconds      INR 1.15    Narrative:      Suggested INR therapeutic range for stable oral anticoagulant therapy:    Low Intensity therapy:   1.5-2.0  Moderate Intensity therapy:   2.0-3.0  High Intensity therapy:   2.5-4.0    aPTT [[id number]]  (Normal) Collected: 09/03/22 1710    Specimen: Blood from Arm, Right Updated: 09/03/22 1802     PTT 32.5 seconds     Narrative:      PTT Heparin Therapeutic Range:  59 - 95 seconds      COVID PRE-OP / PRE-PROCEDURE SCREENING ORDER (NO ISOLATION) - Swab, Nasopharynx [[id number]]  (Normal) Collected: 09/03/22 1719    Specimen: Swab from Nasopharynx Updated: 09/03/22 1750    Narrative:      The following orders were created for panel order COVID PRE-OP / PRE-PROCEDURE SCREENING ORDER (NO ISOLATION) - Swab, Nasopharynx.  Procedure                               Abnormality         Status                     ---------                               -----------         ------                     COVID-19 and FLU A/B PCR...[[id number]]  Normal              Final result                 Please view results for these tests on the individual orders.    COVID-19 and FLU A/B PCR - Swab, Nasopharynx [[id number]]  (Normal) Collected: 09/03/22 1719    Specimen: Swab from Nasopharynx Updated: 09/03/22 1750     COVID19 Not Detected     Influenza A PCR Not Detected     Influenza B PCR Not Detected    Narrative:      Fact sheet for providers: https://www.fda.gov/media/152851/download    Fact sheet for patients: https://www.fda.gov/media/097961/download    Test performed by PCR.    C-reactive Protein [[id number]]  (Abnormal) Collected: 09/03/22 1710    Specimen: Blood from Arm, Right Updated: 09/03/22 1749     C-Reactive Protein 23.79 mg/dL     Ethanol [[id number]] Collected: 09/03/22 1710    Specimen: Blood from Arm, Right Updated: 09/03/22 1749     Ethanol <10 mg/dL      Ethanol %  <0.010 %     Narrative:      >/= 80.0 legally intoxicated    Lactic Acid, Plasma [[id number]]  (Normal) Collected: 09/03/22 1710    Specimen: Blood from Arm, Right Updated: 09/03/22 1746     Lactate 2.0 mmol/L     Sedimentation Rate [[id number]]  (Abnormal) Collected: 09/03/22 1710    Specimen: Blood from Arm, Right Updated: 09/03/22 1731     Sed Rate 128 mm/hr         Imaging Results (Last 24 Hours)     Procedure Component Value Units Date/Time    CT Lower Extremity Right With Contrast [[id number]] Collected: 09/03/22 2015     Updated: 09/03/22 2017    Narrative:      CT LE RT W    INDICATION:    Evaluate for percutaneous abscess right lower extremity. Patient with cellulitis.    TECHNIQUE:   CT of the right lower leg knee to ankle with IV contrast. Coronal and sagittal reconstructions were obtained.  Radiation dose reduction techniques included automated exposure control or exposure modulation based on body size. Count of known CT and  cardiac nuc med studies performed in previous 12 months: 0.      COMPARISON:    None available.    FINDINGS:  Deformity of the calcaneus compatible with old depressed intra-articular fracture of the posterior calcaneus and facet. Depression of the sustentaculum talus with malalignment at the subtalar joint. Ankle joint is unremarkable. No joint effusion. The  knee joint appears normal.    Extensive soft tissue swelling and edema about the ankle but no definitive drainable fluid collection or abscess. Small amount of punctate gas in the medial ankle soft tissues as well as a small focus of gas posterior to the talus. Correlate for any  attempt at aspiration. Ankle tendons unremarkable.    Lower leg musculature appears normal. Visualized neurovascular structures unremarkable.      Impression:      Extensive soft tissue swelling about the medial and lateral ankle with areas of confluent edema but no definitive abscess or definite enhancing fluid collection. Small punctate gas collection  medial and posterior soft tissues. Correlate for any attempt  at aspiration.    Chronic appearing deformity of the calcaneus compatible with depressed fracture of the posterior facet of the calcaneus and depression of the sustentaculum talus. Fracture appears healed but with some degree of malalignment and incongruity at the  posterior subtalar joint.    Signer Name: KACY Knapp MD   Signed: 9/3/2022 8:15 PM   Workstation Name: [facility]    [facility] Arterial Doppler Lower Extremity Right [[id number]] Collected: 09/03/22 1849     Updated: 09/03/22 1851    Narrative:        RIGHT LOWER EXTREMITY ARTERIAL DOPPLER IMAGING, 9/3/2022    HISTORY:    41-year-old male with history of IVDU presenting to the ED with right lower leg swelling and discoloration.      TECHNIQUE:  Right lower extremity arterial vascular ultrasound imaging was performed using grey scale, spectral Doppler and color flow Doppler. Assessed segments defined per protocol.    FINDINGS:  The external iliac, common femoral, deep femoral and superficial femoral arteries appear widely patent. Popliteal artery and imaged posterior tibial artery are also widely patent.    Abnormal low resistance Doppler waveforms throughout the right leg with followed flow throughout diastole, likely reflecting downstream vasodilatation in the setting of infection/inflammation.    Heterogeneous soft tissue swelling and or complex fluid collection at the medial aspect of the ankle, not measured by the ultrasound technologist. A CT examination of the lower extremity has reportedly been ordered. CT with contrast is recommended to  assess for abscess.      Impression:      1.  No evidence of right lower extremity arterial occlusion.  2.  Low resistance arterial flow throughout the right leg compatible with downstream vasodilatation in the clinical setting of infection/inflammation.  3.  Potential heterogeneous fluid soft tissue collection  imaged at the medial right ankle. Recommend CT imaging of the lower leg with IV contrast to assess for potential abscess.      Signer Name: Jason Schwarz MD   Signed: 9/3/2022 6:49 PM   Workstation Name: [email or web address]    [facility]        Orders (last 24 hrs)      Start     Ordered    09/05/22 0702  Inpatient Podiatry Consult  IN AM        Specialty:  Podiatry  Provider:  (Not yet assigned)    09/04/22 0919    09/05/22 0600  CBC & Differential  Morning Draw         09/04/22 0854    09/05/22 0600  Comprehensive Metabolic Panel  Morning Draw         09/04/22 0854    09/05/22 0001  NPO Diet NPO Type: Sips with Meds  Diet Effective Midnight         09/04/22 1039    09/04/22 1041  Opioid Administration - Continuous Pulse Oximetry (SpO2) Monitoring  Per Order Details         09/04/22 1040    09/04/22 1041  Opioid Administration - Document SpO2 Value With Each Set of Vitals & Any Change in Patient Status  Per Order Details         09/04/22 1040    09/04/22 1041  Opioid Administration - Notify Provider Pulse Oximetry (SpO2)  Until Discontinued         09/04/22 1040    09/04/22 1030  Diet Regular  Diet Effective Now         09/04/22 1029    09/04/22 1000  Basic Metabolic Panel  Once         09/04/22 0359    09/04/22 0928  Adult Transthoracic Echo Limited W/ Cont if Necessary Per Protocol  Once         09/04/22 0929    09/04/22 0919  Inpatient Infectious Diseases Consult  Once        Specialty:  Infectious Diseases  Provider:  Nely Mesa MD    09/04/22 0919    09/04/22 0859  Inpatient Orthopedic Surgery Consult  Once        Specialty:  Orthopedic Surgery  Provider:  Jas Echevarria MD    09/04/22 0859    09/04/22 0855  MRSA Screen, PCR (Inpatient) - Swab, Nares  Once         09/04/22 0854    09/04/22 0800  Vital Signs  Every 8 Hours        Comments: Per per hospital policy    09/04/22 0028    09/04/22 0800  Oral Care  2 Times Daily       09/04/22 0028    09/04/22 0800  Inpatient  Nutrition Consult  Once        Provider:  (Not yet assigned)    09/04/22 0340    09/04/22 0757  Blood Culture ID, PCR - Blood, Hand, Right  Once        Comments: From a different site than #1.      09/04/22 0756    09/04/22 0600  Strict Intake & Output  Every 6 Hours         09/04/22 0028    09/04/22 0600  CBC Auto Differential  Morning Draw         09/04/22 0028    09/04/22 0600  Comprehensive Metabolic Panel  Morning Draw         09/04/22 0028    09/04/22 0600  C-reactive Protein  Morning Draw         09/04/22 0028    09/04/22 0600  ABO RH Specimen Verification  Morning Draw         09/03/22 2250    09/04/22 0600  potassium chloride (K-DUR,KLOR-CON) CR tablet 40 mEq  Every 4 Hours         09/04/22 0413    09/04/22 0445  sodium chloride 0.9 % infusion  Continuous         09/04/22 0358    09/04/22 0303  Scan Slide  Once         09/04/22 0302    09/04/22 0200  piperacillin-tazobactam (ZOSYN) IVPB 3.375 g in 100 mL NS VTB  Every 8 Hours         09/04/22 0045    09/04/22 0200  vancomycin (VANCOCIN) 1,000 mg in sodium chloride 0.9 % 250 mL IVPB  Every 8 Hours         09/04/22 0049    09/04/22 0115  sodium chloride 0.9 % flush 10 mL  Every 12 Hours Scheduled         09/04/22 0028    09/04/22 0115  heparin (porcine) 5000 UNIT/ML injection 5,000 Units  Every 12 Hours Scheduled         09/04/22 0028    09/04/22 0100  nicotine (NICODERM CQ) 14 MG/24HR patch 1 patch  Every 24 Hours Scheduled         09/03/22 2350    09/04/22 0029  Notify Physician (with Parameters)  Until Discontinued         09/04/22 0028    09/04/22 0029  Oxygen Therapy- Nasal Cannula; Titrate for SPO2: 90% - 95%  Continuous         09/04/22 0028    09/04/22 0029  Insert Peripheral IV  Once         09/04/22 0028    09/04/22 0029  Saline Lock & Maintain IV Access  Continuous         09/04/22 0028    09/04/22 0029  Activity - Bed Rest With Exceptions (Specify)  Until Discontinued         09/04/22 0028    09/04/22 0029  Daily Weights  Daily       09/04/22 0028     09/04/22 0029  Inpatient General Surgery Consult  Once        Specialty:  General Surgery  Provider:  Gopal Gaxiola MD    09/04/22 0028    09/04/22 0029  Discontinue Cardiac Monitoring  Once         09/04/22 0028    09/04/22 0028  sodium chloride 0.9 % flush 10 mL  As Needed         09/04/22 0028    09/04/22 0028  Pharmacy to dose vancomycin  Continuous PRN,   Status:  Discontinued         09/04/22 0028    09/04/22 0028  Pharmacy to Dose Zosyn  Continuous PRN,   Status:  Discontinued         09/04/22 0028    09/04/22 0001  NPO Diet NPO Type: Sips with Meds  Diet Effective Midnight,   Status:  Canceled         09/03/22 2329 09/03/22 2349  Magnesium  STAT         09/03/22 2348    09/03/22 2349  Initiate Electrolyte Replacement Protocol  Until Discontinued         09/03/22 2348    09/03/22 2349  Patient Currently On Electrolyte Replacement Protocol - Please Refer to MAR for Protocol Details  Misc Nursing Order (Specify)  Daily      Comments: Patient Currently On Electrolyte Replacement Protocol - Please Refer to MAR for Protocol Details    09/03/22 2348    09/03/22 2348  potassium chloride (K-DUR,KLOR-CON) ER tablet 40 mEq  As Needed         09/03/22 2348    09/03/22 2348  potassium chloride (KLOR-CON) packet 40 mEq  As Needed         09/03/22 2348    09/03/22 2348  sodium chloride 0.9 % infusion  Continuous,   Status:  Discontinued         09/03/22 2346    09/03/22 2339  Sodium, Urine, Random - Urine, Clean Catch  STAT         09/03/22 2338    09/03/22 2330  Diet Regular  Diet Effective Now,   Status:  Canceled         09/03/22 2329 09/03/22 2328  morphine injection 1 mg  Every 4

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
## 2022-09-05 ENCOUNTER — ANESTHESIA (OUTPATIENT)
Dept: PERIOP | Facility: HOSPITAL | Age: 42
End: 2022-09-05

## 2022-09-05 ENCOUNTER — ANESTHESIA EVENT (OUTPATIENT)
Dept: PERIOP | Facility: HOSPITAL | Age: 42
End: 2022-09-05

## 2022-09-05 ENCOUNTER — APPOINTMENT (OUTPATIENT)
Dept: MRI IMAGING | Facility: HOSPITAL | Age: 42
End: 2022-09-05

## 2022-09-05 LAB
ALBUMIN SERPL-MCNC: 1.85 G/DL (ref 3.5–5.2)
ALBUMIN/GLOB SERPL: 0.4 G/DL
ALP SERPL-CCNC: 116 U/L (ref 39–117)
ALT SERPL W P-5'-P-CCNC: 13 U/L (ref 1–41)
ANION GAP SERPL CALCULATED.3IONS-SCNC: 10.5 MMOL/L (ref 5–15)
AST SERPL-CCNC: 34 U/L (ref 1–40)
BACTERIA SPEC AEROBE CULT: ABNORMAL
BILIRUB SERPL-MCNC: 1 MG/DL (ref 0–1.2)
BUN SERPL-MCNC: 12 MG/DL (ref 6–20)
BUN/CREAT SERPL: 16 (ref 7–25)
CALCIUM SPEC-SCNC: 9 MG/DL (ref 8.6–10.5)
CHLORIDE SERPL-SCNC: 99 MMOL/L (ref 98–107)
CO2 SERPL-SCNC: 21.5 MMOL/L (ref 22–29)
CREAT SERPL-MCNC: 0.75 MG/DL (ref 0.76–1.27)
CRP SERPL-MCNC: 10.65 MG/DL (ref 0–0.5)
D-LACTATE SERPL-SCNC: 1.2 MMOL/L (ref 0.5–2)
DEPRECATED RDW RBC AUTO: 46.4 FL (ref 37–54)
EGFRCR SERPLBLD CKD-EPI 2021: 117 ML/MIN/1.73
ERYTHROCYTE [DISTWIDTH] IN BLOOD BY AUTOMATED COUNT: 13.7 % (ref 12.3–15.4)
GLOBULIN UR ELPH-MCNC: 5.2 GM/DL
GLUCOSE SERPL-MCNC: 102 MG/DL (ref 65–99)
GRAM STN SPEC: ABNORMAL
HCT VFR BLD AUTO: 43.6 % (ref 37.5–51)
HGB BLD-MCNC: 14.8 G/DL (ref 13–17.7)
LYMPHOCYTES # BLD MANUAL: 1.97 10*3/MM3 (ref 0.7–3.1)
LYMPHOCYTES NFR BLD MANUAL: 5 % (ref 5–12)
MCH RBC QN AUTO: 31.6 PG (ref 26.6–33)
MCHC RBC AUTO-ENTMCNC: 33.9 G/DL (ref 31.5–35.7)
MCV RBC AUTO: 93.2 FL (ref 79–97)
MONOCYTES # BLD: 0.66 10*3/MM3 (ref 0.1–0.9)
NEUTROPHILS # BLD AUTO: 10.5 10*3/MM3 (ref 1.7–7)
NEUTROPHILS NFR BLD MANUAL: 80 % (ref 42.7–76)
PLAT MORPH BLD: NORMAL
PLATELET # BLD AUTO: 296 10*3/MM3 (ref 140–450)
PMV BLD AUTO: 10.2 FL (ref 6–12)
POTASSIUM SERPL-SCNC: 3.8 MMOL/L (ref 3.5–5.2)
PROT SERPL-MCNC: 7 G/DL (ref 6–8.5)
RBC # BLD AUTO: 4.68 10*6/MM3 (ref 4.14–5.8)
RBC MORPH BLD: NORMAL
SODIUM SERPL-SCNC: 131 MMOL/L (ref 136–145)
VANCOMYCIN TROUGH SERPL-MCNC: 8.3 MCG/ML (ref 5–20)
VARIANT LYMPHS NFR BLD MANUAL: 15 % (ref 19.6–45.3)
WBC NRBC COR # BLD: 13.12 10*3/MM3 (ref 3.4–10.8)

## 2022-09-05 PROCEDURE — 25010000002 MORPHINE PER 10 MG: Performed by: HOSPITALIST

## 2022-09-05 PROCEDURE — 87186 SC STD MICRODIL/AGAR DIL: CPT | Performed by: PODIATRIST

## 2022-09-05 PROCEDURE — 0J9Q0ZZ DRAINAGE OF RIGHT FOOT SUBCUTANEOUS TISSUE AND FASCIA, OPEN APPROACH: ICD-10-PCS | Performed by: PODIATRIST

## 2022-09-05 PROCEDURE — 25010000002 MORPHINE PER 10 MG: Performed by: PODIATRIST

## 2022-09-05 PROCEDURE — 87205 SMEAR GRAM STAIN: CPT | Performed by: PODIATRIST

## 2022-09-05 PROCEDURE — 80202 ASSAY OF VANCOMYCIN: CPT

## 2022-09-05 PROCEDURE — 25010000002 HEPARIN (PORCINE) PER 1000 UNITS: Performed by: PODIATRIST

## 2022-09-05 PROCEDURE — 85025 COMPLETE CBC W/AUTO DIFF WBC: CPT | Performed by: HOSPITALIST

## 2022-09-05 PROCEDURE — 25010000002 VANCOMYCIN 1 G RECONSTITUTED SOLUTION 1 EACH VIAL: Performed by: HOSPITALIST

## 2022-09-05 PROCEDURE — 99222 1ST HOSP IP/OBS MODERATE 55: CPT | Performed by: INTERNAL MEDICINE

## 2022-09-05 PROCEDURE — 87147 CULTURE TYPE IMMUNOLOGIC: CPT | Performed by: PODIATRIST

## 2022-09-05 PROCEDURE — 25010000002 HYDRALAZINE PER 20 MG: Performed by: NURSE ANESTHETIST, CERTIFIED REGISTERED

## 2022-09-05 PROCEDURE — 25010000002 MIDAZOLAM PER 1 MG: Performed by: NURSE ANESTHETIST, CERTIFIED REGISTERED

## 2022-09-05 PROCEDURE — 25010000002 VANCOMYCIN 5 G RECONSTITUTED SOLUTION: Performed by: INTERNAL MEDICINE

## 2022-09-05 PROCEDURE — 73721 MRI JNT OF LWR EXTRE W/O DYE: CPT

## 2022-09-05 PROCEDURE — 85007 BL SMEAR W/DIFF WBC COUNT: CPT | Performed by: HOSPITALIST

## 2022-09-05 PROCEDURE — 25010000002 HYDROMORPHONE PER 4 MG: Performed by: NURSE ANESTHETIST, CERTIFIED REGISTERED

## 2022-09-05 PROCEDURE — 99233 SBSQ HOSP IP/OBS HIGH 50: CPT | Performed by: INTERNAL MEDICINE

## 2022-09-05 PROCEDURE — 25010000002 KETOROLAC TROMETHAMINE PER 15 MG: Performed by: NURSE ANESTHETIST, CERTIFIED REGISTERED

## 2022-09-05 PROCEDURE — 25010000002 FENTANYL CITRATE (PF) 50 MCG/ML SOLUTION: Performed by: NURSE ANESTHETIST, CERTIFIED REGISTERED

## 2022-09-05 PROCEDURE — 87070 CULTURE OTHR SPECIMN AEROBIC: CPT | Performed by: PODIATRIST

## 2022-09-05 PROCEDURE — 25010000002 PROPOFOL 10 MG/ML EMULSION: Performed by: NURSE ANESTHETIST, CERTIFIED REGISTERED

## 2022-09-05 PROCEDURE — 25010000002 ONDANSETRON PER 1 MG: Performed by: NURSE ANESTHETIST, CERTIFIED REGISTERED

## 2022-09-05 PROCEDURE — 86140 C-REACTIVE PROTEIN: CPT | Performed by: PODIATRIST

## 2022-09-05 PROCEDURE — 83605 ASSAY OF LACTIC ACID: CPT | Performed by: INTERNAL MEDICINE

## 2022-09-05 PROCEDURE — 80053 COMPREHEN METABOLIC PANEL: CPT | Performed by: HOSPITALIST

## 2022-09-05 PROCEDURE — 87040 BLOOD CULTURE FOR BACTERIA: CPT | Performed by: INTERNAL MEDICINE

## 2022-09-05 PROCEDURE — 25010000002 PIPERACILLIN SOD-TAZOBACTAM PER 1 G: Performed by: HOSPITALIST

## 2022-09-05 PROCEDURE — 0 MEPERIDINE PER 100 MG: Performed by: NURSE ANESTHETIST, CERTIFIED REGISTERED

## 2022-09-05 RX ORDER — IPRATROPIUM BROMIDE AND ALBUTEROL SULFATE 2.5; .5 MG/3ML; MG/3ML
3 SOLUTION RESPIRATORY (INHALATION) ONCE AS NEEDED
Status: DISCONTINUED | OUTPATIENT
Start: 2022-09-05 | End: 2022-09-05 | Stop reason: HOSPADM

## 2022-09-05 RX ORDER — MIDAZOLAM HYDROCHLORIDE 1 MG/ML
1 INJECTION INTRAMUSCULAR; INTRAVENOUS
Status: DISCONTINUED | OUTPATIENT
Start: 2022-09-05 | End: 2022-09-05 | Stop reason: HOSPADM

## 2022-09-05 RX ORDER — FENTANYL CITRATE 50 UG/ML
INJECTION, SOLUTION INTRAMUSCULAR; INTRAVENOUS AS NEEDED
Status: DISCONTINUED | OUTPATIENT
Start: 2022-09-05 | End: 2022-09-05 | Stop reason: SURG

## 2022-09-05 RX ORDER — SODIUM CHLORIDE, SODIUM LACTATE, POTASSIUM CHLORIDE, CALCIUM CHLORIDE 600; 310; 30; 20 MG/100ML; MG/100ML; MG/100ML; MG/100ML
INJECTION, SOLUTION INTRAVENOUS CONTINUOUS PRN
Status: DISCONTINUED | OUTPATIENT
Start: 2022-09-05 | End: 2022-09-05 | Stop reason: SURG

## 2022-09-05 RX ORDER — SODIUM CHLORIDE 0.9 % (FLUSH) 0.9 %
10 SYRINGE (ML) INJECTION AS NEEDED
Status: DISCONTINUED | OUTPATIENT
Start: 2022-09-05 | End: 2022-09-05 | Stop reason: HOSPADM

## 2022-09-05 RX ORDER — MIDAZOLAM HYDROCHLORIDE 1 MG/ML
INJECTION INTRAMUSCULAR; INTRAVENOUS AS NEEDED
Status: DISCONTINUED | OUTPATIENT
Start: 2022-09-05 | End: 2022-09-05 | Stop reason: SURG

## 2022-09-05 RX ORDER — PROPOFOL 10 MG/ML
VIAL (ML) INTRAVENOUS AS NEEDED
Status: DISCONTINUED | OUTPATIENT
Start: 2022-09-05 | End: 2022-09-05 | Stop reason: SURG

## 2022-09-05 RX ORDER — DEXMEDETOMIDINE HYDROCHLORIDE 100 UG/ML
INJECTION, SOLUTION INTRAVENOUS AS NEEDED
Status: DISCONTINUED | OUTPATIENT
Start: 2022-09-05 | End: 2022-09-05 | Stop reason: SURG

## 2022-09-05 RX ORDER — ONDANSETRON 2 MG/ML
INJECTION INTRAMUSCULAR; INTRAVENOUS AS NEEDED
Status: DISCONTINUED | OUTPATIENT
Start: 2022-09-05 | End: 2022-09-05 | Stop reason: SURG

## 2022-09-05 RX ORDER — HYDRALAZINE HYDROCHLORIDE 20 MG/ML
INJECTION INTRAMUSCULAR; INTRAVENOUS AS NEEDED
Status: DISCONTINUED | OUTPATIENT
Start: 2022-09-05 | End: 2022-09-05 | Stop reason: SURG

## 2022-09-05 RX ORDER — KETAMINE HYDROCHLORIDE 100 MG/ML
INJECTION INTRAMUSCULAR; INTRAVENOUS AS NEEDED
Status: DISCONTINUED | OUTPATIENT
Start: 2022-09-05 | End: 2022-09-05 | Stop reason: SURG

## 2022-09-05 RX ORDER — SODIUM CHLORIDE, SODIUM LACTATE, POTASSIUM CHLORIDE, CALCIUM CHLORIDE 600; 310; 30; 20 MG/100ML; MG/100ML; MG/100ML; MG/100ML
100 INJECTION, SOLUTION INTRAVENOUS ONCE AS NEEDED
Status: DISCONTINUED | OUTPATIENT
Start: 2022-09-05 | End: 2022-09-05 | Stop reason: HOSPADM

## 2022-09-05 RX ORDER — MEPERIDINE HYDROCHLORIDE 25 MG/ML
12.5 INJECTION INTRAMUSCULAR; INTRAVENOUS; SUBCUTANEOUS
Status: DISCONTINUED | OUTPATIENT
Start: 2022-09-05 | End: 2022-09-05 | Stop reason: HOSPADM

## 2022-09-05 RX ORDER — ONDANSETRON 2 MG/ML
4 INJECTION INTRAMUSCULAR; INTRAVENOUS AS NEEDED
Status: DISCONTINUED | OUTPATIENT
Start: 2022-09-05 | End: 2022-09-05 | Stop reason: HOSPADM

## 2022-09-05 RX ORDER — FAMOTIDINE 10 MG/ML
INJECTION, SOLUTION INTRAVENOUS AS NEEDED
Status: DISCONTINUED | OUTPATIENT
Start: 2022-09-05 | End: 2022-09-05 | Stop reason: SURG

## 2022-09-05 RX ORDER — OXYCODONE HYDROCHLORIDE AND ACETAMINOPHEN 5; 325 MG/1; MG/1
1 TABLET ORAL ONCE AS NEEDED
Status: DISCONTINUED | OUTPATIENT
Start: 2022-09-05 | End: 2022-09-05 | Stop reason: HOSPADM

## 2022-09-05 RX ORDER — KETOROLAC TROMETHAMINE 30 MG/ML
30 INJECTION, SOLUTION INTRAMUSCULAR; INTRAVENOUS EVERY 6 HOURS PRN
Status: COMPLETED | OUTPATIENT
Start: 2022-09-05 | End: 2022-09-05

## 2022-09-05 RX ORDER — SODIUM CHLORIDE 0.9 % (FLUSH) 0.9 %
10 SYRINGE (ML) INJECTION EVERY 12 HOURS SCHEDULED
Status: DISCONTINUED | OUTPATIENT
Start: 2022-09-05 | End: 2022-09-05 | Stop reason: HOSPADM

## 2022-09-05 RX ORDER — SODIUM CHLORIDE, SODIUM LACTATE, POTASSIUM CHLORIDE, CALCIUM CHLORIDE 600; 310; 30; 20 MG/100ML; MG/100ML; MG/100ML; MG/100ML
125 INJECTION, SOLUTION INTRAVENOUS ONCE
Status: DISCONTINUED | OUTPATIENT
Start: 2022-09-05 | End: 2022-09-05 | Stop reason: HOSPADM

## 2022-09-05 RX ORDER — MAGNESIUM HYDROXIDE 1200 MG/15ML
LIQUID ORAL AS NEEDED
Status: DISCONTINUED | OUTPATIENT
Start: 2022-09-05 | End: 2022-09-05 | Stop reason: HOSPADM

## 2022-09-05 RX ORDER — FENTANYL CITRATE 50 UG/ML
50 INJECTION, SOLUTION INTRAMUSCULAR; INTRAVENOUS
Status: DISCONTINUED | OUTPATIENT
Start: 2022-09-05 | End: 2022-09-05 | Stop reason: HOSPADM

## 2022-09-05 RX ORDER — HYDROMORPHONE HCL 110MG/55ML
PATIENT CONTROLLED ANALGESIA SYRINGE INTRAVENOUS AS NEEDED
Status: DISCONTINUED | OUTPATIENT
Start: 2022-09-05 | End: 2022-09-05 | Stop reason: SURG

## 2022-09-05 RX ORDER — LABETALOL HYDROCHLORIDE 5 MG/ML
INJECTION, SOLUTION INTRAVENOUS AS NEEDED
Status: DISCONTINUED | OUTPATIENT
Start: 2022-09-05 | End: 2022-09-05 | Stop reason: SURG

## 2022-09-05 RX ADMIN — MEPERIDINE HYDROCHLORIDE 12.5 MG: 25 INJECTION, SOLUTION INTRAMUSCULAR; INTRAVENOUS; SUBCUTANEOUS at 18:19

## 2022-09-05 RX ADMIN — KETOROLAC TROMETHAMINE 30 MG: 30 INJECTION, SOLUTION INTRAMUSCULAR; INTRAVENOUS at 18:17

## 2022-09-05 RX ADMIN — MIDAZOLAM HYDROCHLORIDE 2 MG: 1 INJECTION, SOLUTION INTRAMUSCULAR; INTRAVENOUS at 17:04

## 2022-09-05 RX ADMIN — VANCOMYCIN HYDROCHLORIDE 1000 MG: 1 INJECTION, POWDER, LYOPHILIZED, FOR SOLUTION INTRAVENOUS at 10:25

## 2022-09-05 RX ADMIN — ONDANSETRON 4 MG: 2 INJECTION INTRAMUSCULAR; INTRAVENOUS at 17:20

## 2022-09-05 RX ADMIN — HYDROMORPHONE HYDROCHLORIDE 1 MG: 2 INJECTION, SOLUTION INTRAMUSCULAR; INTRAVENOUS; SUBCUTANEOUS at 17:09

## 2022-09-05 RX ADMIN — HYDROMORPHONE HYDROCHLORIDE 0.4 MG: 2 INJECTION, SOLUTION INTRAMUSCULAR; INTRAVENOUS; SUBCUTANEOUS at 17:23

## 2022-09-05 RX ADMIN — Medication 10 ML: at 21:04

## 2022-09-05 RX ADMIN — MORPHINE SULFATE 1 MG: 2 INJECTION, SOLUTION INTRAMUSCULAR; INTRAVENOUS at 01:51

## 2022-09-05 RX ADMIN — MORPHINE SULFATE 1 MG: 2 INJECTION, SOLUTION INTRAMUSCULAR; INTRAVENOUS at 16:09

## 2022-09-05 RX ADMIN — PIPERACILLIN SODIUM AND TAZOBACTAM SODIUM 3.38 G: 3; .375 INJECTION, POWDER, LYOPHILIZED, FOR SOLUTION INTRAVENOUS at 01:50

## 2022-09-05 RX ADMIN — Medication 10 ML: at 13:06

## 2022-09-05 RX ADMIN — SODIUM CHLORIDE 125 ML/HR: 9 INJECTION, SOLUTION INTRAVENOUS at 16:09

## 2022-09-05 RX ADMIN — VANCOMYCIN HYDROCHLORIDE 1250 MG: 5 INJECTION, POWDER, LYOPHILIZED, FOR SOLUTION INTRAVENOUS at 18:48

## 2022-09-05 RX ADMIN — FENTANYL CITRATE 100 MCG: 50 INJECTION INTRAMUSCULAR; INTRAVENOUS at 17:04

## 2022-09-05 RX ADMIN — HYDRALAZINE HYDROCHLORIDE 10 MG: 20 INJECTION INTRAMUSCULAR; INTRAVENOUS at 18:02

## 2022-09-05 RX ADMIN — ETHYL ALCOHOL 1 EACH: 62 SWAB TOPICAL at 08:42

## 2022-09-05 RX ADMIN — MORPHINE SULFATE 1 MG: 2 INJECTION, SOLUTION INTRAMUSCULAR; INTRAVENOUS at 05:51

## 2022-09-05 RX ADMIN — PROPOFOL 200 MG: 10 INJECTION, EMULSION INTRAVENOUS at 17:09

## 2022-09-05 RX ADMIN — HYDROMORPHONE HYDROCHLORIDE 0.2 MG: 2 INJECTION, SOLUTION INTRAMUSCULAR; INTRAVENOUS; SUBCUTANEOUS at 17:42

## 2022-09-05 RX ADMIN — VANCOMYCIN HYDROCHLORIDE 1000 MG: 1 INJECTION, POWDER, LYOPHILIZED, FOR SOLUTION INTRAVENOUS at 01:50

## 2022-09-05 RX ADMIN — MORPHINE SULFATE 1 MG: 2 INJECTION, SOLUTION INTRAMUSCULAR; INTRAVENOUS at 21:04

## 2022-09-05 RX ADMIN — PIPERACILLIN SODIUM AND TAZOBACTAM SODIUM 3.38 G: 3; .375 INJECTION, POWDER, LYOPHILIZED, FOR SOLUTION INTRAVENOUS at 10:13

## 2022-09-05 RX ADMIN — NICOTINE 1 PATCH: 14 PATCH TRANSDERMAL at 08:42

## 2022-09-05 RX ADMIN — HYDROMORPHONE HYDROCHLORIDE 0.4 MG: 2 INJECTION, SOLUTION INTRAMUSCULAR; INTRAVENOUS; SUBCUTANEOUS at 17:27

## 2022-09-05 RX ADMIN — KETAMINE HYDROCHLORIDE 30 MG: 100 INJECTION INTRAMUSCULAR; INTRAVENOUS at 17:09

## 2022-09-05 RX ADMIN — SODIUM CHLORIDE, POTASSIUM CHLORIDE, SODIUM LACTATE AND CALCIUM CHLORIDE: 600; 310; 30; 20 INJECTION, SOLUTION INTRAVENOUS at 17:06

## 2022-09-05 RX ADMIN — FAMOTIDINE 20 MG: 10 INJECTION INTRAVENOUS at 17:20

## 2022-09-05 RX ADMIN — LABETALOL HYDROCHLORIDE 10 MG: 5 INJECTION INTRAVENOUS at 17:52

## 2022-09-05 RX ADMIN — DEXMEDETOMIDINE HYDROCHLORIDE 2 MCG: 100 INJECTION, SOLUTION INTRAVENOUS at 17:35

## 2022-09-05 RX ADMIN — HEPARIN SODIUM 5000 UNITS: 5000 INJECTION INTRAVENOUS; SUBCUTANEOUS at 21:04

## 2022-09-05 RX ADMIN — ETHYL ALCOHOL 1 EACH: 62 SWAB TOPICAL at 21:04

## 2022-09-05 RX ADMIN — MORPHINE SULFATE 1 MG: 2 INJECTION, SOLUTION INTRAMUSCULAR; INTRAVENOUS at 10:13

## 2022-09-05 NOTE — ANESTHESIA PREPROCEDURE EVALUATION
Anesthesia Evaluation     Patient summary reviewed and Nursing notes reviewed   no history of anesthetic complications:  NPO Solid Status: > 4 hours  NPO Liquid Status: > 8 hours           Airway   Mallampati: II  TM distance: >3 FB  Neck ROM: full  No difficulty expected  Dental - normal exam   (+) poor dentition    Pulmonary - normal exam   (+) a smoker Current Smoked day of surgery,   Cardiovascular - negative cardio ROS    ECG reviewed    (+) peripheral edema (erythema, swelling Right ankle),     ROS comment: TTE 9/5/22    Interpretation Summary    · Estimated left ventricular EF = 65% Left ventricular ejection fraction appears to be 61 - 65%. Left ventricular systolic function is normal.  · Left ventricular diastolic function was normal.  · Estimated right ventricular systolic pressure from tricuspid regurgitation is normal (<35 mmHg).  · No significant valvular abnormality. No definite vegetation or mass seen  · No pericardial effusion  · No prev echo         Neuro/Psych- negative ROS  GI/Hepatic/Renal/Endo - negative ROS     Musculoskeletal     (+) chronic pain,   Abdominal  - normal exam    Bowel sounds: normal.   Substance History   (+) alcohol use, drug use (IVDU Opioids)     OB/GYN negative ob/gyn ROS         Other - negative ROS                     Anesthesia Plan    ASA 3 - emergent     general     intravenous induction     Anesthetic plan, risks, benefits, and alternatives have been provided, discussed and informed consent has been obtained with: patient.    Use of blood products discussed with patient  Consented to blood products.       CODE STATUS:    Level Of Support Discussed With: Patient  Code Status (Patient has no pulse and is not breathing): CPR (Attempt to Resuscitate)  Medical Interventions (Patient has pulse or is breathing): Full Support        Lab Results   Component Value Date    WBC 13.12 (H) 09/05/2022    HGB 14.8 09/05/2022    HCT 43.6 09/05/2022    MCV 93.2 09/05/2022      09/05/2022         Lab Results   Component Value Date    GLUCOSE 102 (H) 09/05/2022    BUN 12 09/05/2022    CREATININE 0.75 (L) 09/05/2022    BCR 16.0 09/05/2022    K 3.8 09/05/2022    CO2 21.5 (L) 09/05/2022    CALCIUM 9.0 09/05/2022    ALBUMIN 1.85 (L) 09/05/2022    AST 34 09/05/2022    ALT 13 09/05/2022
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
## 2022-09-05 NOTE — PLAN OF CARE
Goal Outcome Evaluation:  Plan of Care Reviewed With: patient        Progress: no change  Outcome Evaluation: Pt resting in bed during assessment. Pt C/O pain, prn pain meds given per order. Vital signs stable, no acute changes at this time. Will continue to monitor.

## 2022-09-05 NOTE — OP NOTE
"INCISION AND DRAINAGE LOWER EXTREMITY  Procedure Note    Matt Harvinder  9/5/2022    Surgeon Assistant: NATHALIA Nielson    Pre-op Diagnosis:   Abscess of foot [L02.619]   Septic Arthritis Subtalar Joint Right  Abcess Ankle Medial, Right  Abcess Ankle Lateral, Right    Post-op Diagnosis:     Post-Op Diagnosis Codes:     * Abscess of foot/Ankle Right [L02.619]\    * Osteomyelitis Calcaneus Right      Procedure(s):  INCISION AND DRAINAGE LOWER EXTREMITY MEDIAL AND LATERAL ANKLE CALCANEAL BONE LEVEL, RIGHT  INCISION AND DRAINAGE SUBTALAR JOINT, RIGHT    Surgeon(s):  Adebayo Rios MD    Staff:   Circulator: Courtney Jones RN  Scrub Person: Patrica Oden  Assistant: Gopal Rosaels    HEMOSTASIS:Pneumatic Calf Tourniquet 275mmhg    Estimated Blood Loss: <500ml    MATERIALS: Iodine Soaked Packing strip 1 inch    Specimens:                Order Name Source Comment Collection Info Order Time   WOUND CULTURE Ankle, Right  Collected By: Adebayo Rios MD 9/5/2022  5:27 PM     Release to patient   Routine Release        WOUND CULTURE Ankle, Right  Collected By: Adebayo Rios MD 9/5/2022  5:28 PM     Release to patient   Routine Release              Findings: Septic OA subtalar joint, Abcess posterior medial ankle communicated \"U\" orientation around posterior heel to anterior lateral sinsus tarsi at subtalar joint.     Indicatioin for Procedure. Abscess with septic joint, Right Ankle.     Procedure(s):  INCISION AND DRAINAGE LOWER EXTREMITY MEDIAL AND LATERAL ANKLE CALCANEAL BONE LEVEL, RIGHT. Under IV sedation, the patient was brought OR placed operative table supine position, tourniquet applied calf level. Extremity was scrubbed prepped and draped usual sterile manner. Tourniquet elevated after esanguination to 275mmhg. Attention directed to posterior medial ankle over abcess tense nidus area, 10 blade incised abcess collection releasing milky phlegn purulence with culture obtained. Blunt finger probe showed this abcess " void communicated around back of kagers triangle with exposed calcaneus palpated deep abcess track continued to anterior lateral ankle subtalar level. Separate incisioin 15 blade over seprerate abcess accumulation performed released purulent fluid with swab culture. Pulse Lavage with Vancomycin Saline 2 liters.     INCISION AND DRAINAGE SUBTALAR JOINT, RIGHT  Blunt probe down to latera subtalar joint over sinus  Tarsi, were joint capsue tense with fluid incised released intraarticular purulence from septic joint area pulse lavage all areas total 3liters vancomycin saline. Both opening were packed with iodine soaked 1inch string gauze, bulky dressing applied after tourniquet released. Patient transferred OR to PACU vital signs stable.     Prognosis gaurded to Poor, High Risk for Proxima Amputation.       Adebayo Rios MD     Date: 9/5/2022  Time: 17:36 EDT

## 2022-09-05 NOTE — PROGRESS NOTES
[facility] HOSPITALIST PROGRESS NOTE     Patient Identification:  Name:  Matt Blanton  Age:  41 y.o.  Sex:  male  :  1980  MRN:  [id number]  Visit Number:  [id number]  ROOM: [address] [place] [address]     Primary Care Provider:  Provider, No Known    Length of stay in inpatient status:  2    Subjective     Chief Compliant:    Chief Complaint   Patient presents with   • Abscess       History of Presenting Illness:    Patient continues to not generally feeling unwell. He notes malaise and pain all over. Notes right ankles feels about the same. Denies any new complaints.     ROS:  Otherwise 10 point ROS negative other than documented above in HPI.     Objective     Current Hospital Meds:[MAR Hold] ethyl alcohol, 1 application, Nasal, BID  [MAR Hold] heparin (porcine), 5,000 Units, Subcutaneous, Q12H  [MAR Hold] nicotine, 1 patch, Transdermal, Q24H  piperacillin-tazobactam, 3.375 g, Intravenous, Q8H  [MAR Hold] sodium chloride, 10 mL, Intravenous, Q12H  vancomycin, 1,250 mg, Intravenous, Q8H    sodium chloride, 125 mL/hr, Last Rate: 125 mL/hr (22 1609)        Current Antimicrobial Therapy:  Anti-Infectives (From admission, onward)    Ordered     Dose/Rate Route Frequency Start Stop    22 1505  vancomycin 1250 mg/250 mL 0.9% NS IVPB ([facility])        Ordering Provider: Meek García MD    1,250 mg  over 60 Minutes Intravenous Every 8 Hours 22 1800 09/10/22 1759    22 0045  piperacillin-tazobactam (ZOSYN) IVPB 3.375 g in 100 mL NS VTB        Ordering Provider: Timbo Green MD    3.375 g  over 4 Hours Intravenous Every 8 Hours 22 0200 22 0159    22 1740  piperacillin-tazobactam (ZOSYN) IVPB 3.375 g in 100 mL NS VTB        Ordering Provider: Salina Strickland DO    3.375 g Intravenous Once 22 1742 22 1911    22 1714  vancomycin (VANCOCIN) 1 g in sodium chloride 0.9 % 250 mL IVPB        Ordering Provider: Marco A, Sal[place], DO    1 g  over 60 Minutes  Intravenous Once 09/03/22 1716 09/03/22 1915        Current Diuretic Therapy:  Diuretics (From admission, onward)    None        ----------------------------------------------------------------------------------------------------------------------  Vital Signs:  Temp:  [97.9 °F (36.6 °C)-99.6 °F (37.6 °C)] 97.9 °F (36.6 °C)  Heart Rate:  [78-91] 91  Resp:  [18-22] 20  BP: (141-169)/() 158/97  SpO2:  [97 %-100 %] 99 %  on  Flow (L/min):  [2-4] 2;   Device (Oxygen Therapy): nasal cannula  Body mass index is 25.1 kg/m².    Wt Readings from Last 3 Encounters:   09/05/22 93.5 kg (206 lb 3.2 oz)     Intake & Output (last 3 days)       09/02 0701 09/03 0700 09/03 0701  09/04 0700 09/04 0701  09/05 0700 09/05 0701  09/06 0700    P.O.  0 1420 360    I.V. (mL/kg)   1437.5 (15.4) 500 (5.3)    IV Piggyback  1350      Total Intake(mL/kg)  1350 (14.3) 2857.5 (30.6) 860 (9.2)    Urine (mL/kg/hr)   2425 (1.1) 1000 (0.9)    Stool    0    Total Output   2425 1000    Net  +1350 +432.5 -140            Stool Unmeasured Occurrence    1 x        NPO Diet NPO Type: Strict NPO  NPO Diet NPO Type: Strict NPO  NPO Diet NPO Type: Ice Chips  ----------------------------------------------------------------------------------------------------------------------  Physical exam:  Constitutional:  Well-developed and well-nourished.  No respiratory distress.      HENT:  Head:  Normocephalic and atraumatic.  Mouth:  Moist mucous membranes.    Eyes:  Conjunctivae and EOM are normal. No scleral icterus.    Neck:  Neck supple.  No JVD present.    Cardiovascular:  Normal rate, regular rhythm and normal heart sounds with no murmur.  Pulmonary/Chest:  No respiratory distress, no wheezes, no crackles, with normal breath sounds and good air movement.  Abdominal:  Soft.  Bowel sounds are normal.  No distension and no tenderness.   Musculoskeletal:  R ankles swollen, red, tender, flocculent area around medial maleolus, very limited active and passive ROM.    Neurological:  Alert and oriented to person, place, and time.  No cranial nerve deficit.  No tongue deviation.  No facial droop.  No slurred speech.   Skin:  Skin is warm and dry. No rash noted. No pallor.   Peripheral vascular:  Pulses in all 4 extremities with no clubbing, no cyanosis, no edema.  ----------------------------------------------------------------------------------------------------------------------  Tele:    ----------------------------------------------------------------------------------------------------------------------  Results from last 7 days   Lab Units 09/05/22  1538 09/05/22  0858 09/05/22  0341 09/04/22  0253 09/03/22  1942 09/03/22  1710   CRP mg/dL  --  10.65*  --  16.74*  --  23.79*   LACTATE mmol/L 1.2  --   --   --   --  2.0   WBC 10*3/mm3  --   --  13.12* 14.37* 14.36*  --    HEMOGLOBIN g/dL  --   --  14.8 13.5 13.8  --    HEMATOCRIT %  --   --  43.6 39.3 40.1  --    MCV fL  --   --  93.2 90.3 90.7  --    MCHC g/dL  --   --  33.9 34.4 34.4  --    PLATELETS 10*3/mm3  --   --  296 287 289  --    INR   --   --   --   --   --  1.15*         Results from last 7 days   Lab Units 09/05/22  0341 09/04/22  1736 09/04/22  0253 09/03/22  2210 09/03/22  1710   SODIUM mmol/L 131* 131* 131* 127* 128*   POTASSIUM mmol/L 3.8 4.0  4.0 3.1* 3.3* 3.3*   MAGNESIUM mg/dL  --   --   --  1.9  --    CHLORIDE mmol/L 99 97* 91* 91* 88*   CO2 mmol/L 21.5* 26.1 26.4 24.2 25.2   BUN mg/dL 12 16 17 17 17   CREATININE mg/dL 0.75* 0.86 0.85 0.79 0.81   CALCIUM mg/dL 9.0 9.1 8.7 9.0 9.7   GLUCOSE mg/dL 102* 111* 107* 109* 110*   ALBUMIN g/dL 1.85*  --  1.86*  --  2.79*   BILIRUBIN mg/dL 1.0  --  1.5*  --  1.9*   ALK PHOS U/L 116  --  161*  --  161*   AST (SGOT) U/L 34  --  38  --  57*   ALT (SGPT) U/L 13  --  18  --  24   Estimated Creatinine Clearance: 173.1 mL/min (A) (by C-G formula based on SCr of 0.75 mg/dL (L)).  No results found for: AMMONIA              No results found for: HGBA1C, POCGLU  No results  found for: TSH, FREET4  No results found for: PREGTESTUR, PREGSERUM, HCG, HCGQUANT  Pain Management Panel     Pain Management Panel Latest Ref Rng & Units 9/3/2022    AMPHETAMINES SCREEN, URINE Negative Negative    BARBITURATES SCREEN Negative Negative    BENZODIAZEPINE SCREEN, URINE Negative Negative    BUPRENORPHINEUR Negative Negative    COCAINE SCREEN, URINE Negative Negative    METHADONE SCREEN, URINE Negative Negative    METHAMPHETAMINEUR Negative Negative        Brief Urine Lab Results     None        Blood Culture   Date Value Ref Range Status   09/04/2022 Abnormal Stain (C)  Preliminary   09/04/2022 Abnormal Stain (C)  Preliminary   09/03/2022 Staphylococcus aureus, MRSA (C)  Preliminary     Comment:     Infectious disease consultation is highly recommended to rule out distant foci of infection.  Methicillin resistant Staphylococcus aureus, Patient may be an isolation risk.   09/03/2022 Staphylococcus aureus, MRSA (C)  Preliminary     Comment:     Infectious disease consultation is highly recommended to rule out distant foci of infection.  Methicillin resistant Staphylococcus aureus, Patient may be an isolation risk.     No results found for: URINECX  Wound Culture   Date Value Ref Range Status   09/03/2022 Moderate growth (3+) Staphylococcus aureus, MRSA (A)  Final     Comment:     Methicillin resistant Staphylococcus aureus, Patient may be an isolation risk.     No results found for: STOOLCX  No results found for: RESPCX  No results found for: AFBCX  Results from last 7 days   Lab Units 09/05/22  1538 09/05/22  0858 09/04/22  0253 09/03/22  1710   LACTATE mmol/L 1.2  --   --  2.0   SED RATE mm/hr  --   --   --  128*   CRP mg/dL  --  10.65* 16.74* 23.79*       I have personally looked at the labs and they are summarized above.  ----------------------------------------------------------------------------------------------------------------------  Detailed radiology reports for the last 24 hours:    Imaging  Results (Last 24 Hours)     Procedure Component Value Units Date/Time    MRI Ankle Right Without Contrast [[id number]] Collected: 09/05/22 1505     Updated: 09/05/22 1507    Narrative:      MRI Ankle RT WO    INDICATION:    Reported substance abuse. Clinical concern for abscess and cellulitis right lower extremity.    TECHNIQUE:   MRI of the right ankle without IV contrast.    COMPARISON:    CT right lower extremity 9/3/2022    FINDINGS:  Study limited due to lack of contrast and limited sequences. Extensive soft tissue swelling about the lower leg ankle and foot with at least 2 large complex confluent areas of increased T2 signal. In the posterior ankle medial to the Achilles tendon  there is a 3.9 x 2 x 7 cm collection which could represent a developing abscess. Along the lateral ankle anterior lateral to the fibula and peroneal tendons there is a 4.5 x 2.5 x 7.6 cm complex collection which could also represent a developing abscess.    Small ankle and subtalar joint effusions could be reactive but underlying infection not entirely excluded. No evidence of ankle joint destruction. Deformity of the posterior facet of the calcaneus compatible with likely old calcaneal fracture. No  destructive changes within the subtalar joint to suggest septic arthritis.    Moderate amount of tendinosis Achilles tendon particularly at the distal tendon insertion but no tear. Peroneus longus tendinosis without tear. Medial flexor and visualized anterior extensor tendons appear normal. Nonvisualization of the anterior  talofibular ligament likely the sequela of chronic tear.    Mild thickening and edema within the central medial cord of plantar fascia may reflect plantar fasciitis. Intrinsic foot musculature unremarkable.      Impression:      Extensive soft tissue swelling and edema about the lower leg ankle and foot with 2 large complex areas of increased attenuation within the subcutaneous tissues as detailed above which in the  clinical setting may represent developing abscesses.  Superimposed generalized soft tissue swelling likely reflecting underlying cellulitis.    Minimal ankle and subtalar joint fluid but no evidence of destructive changes to strongly support septic arthritis. Evaluation of the posterior subtalar joint limited as there is a superimposed changes from apparent chronic intra-articular calcaneal  fracture. Recent CT suggested healing with some degree of malunion.    Moderate Achilles tendinosis without tear. Mild plantar fasciitis.    Findings called and discussed with the patient's nurse on 3 N. at the time of this dictation.    Signer Name: KACY Knapp MD   Signed: 9/5/2022 3:05 PM   Workstation Name: [email or web address]    [facility]        Assessment & Plan      #Sepsis in setting of MRSA bacteremia  #Cellulitis of right ankles vs. Septic arthritis   #Hx of IV drug use   #Dental caries   - Blood cultures from 9/2 and 9/4 positive for MRSA in 2/2 bottles.   - Wound culture from 9/3 also growing MRSA  - Will repeat blood cultures   - Podiatry taking for I&D on 9/5  - TTE did not reveal endocarditis but patient high risk. Will get USMMER  - Will continue vanc and stop zosyn.     #Hepatitis C positive antibody   - Outpatient f/u. LFTs normalized.     #Hypokalemia  - Replace as needed     F: PO  E: Replace as needed   N: Regular     Code status: Full     Dispo: Pending clinical improvement       VTE Prophylaxis:   Mechanical Order History:     None      Pharmalogical Order History:      Ordered     Dose Route Frequency Stop    09/04/22 0028  [MAR Hold]  heparin (porcine) 5000 UNIT/ML injection 5,000 Units        (MAR Hold since Mon 9/5/2022 at 1647.Hold Reason: Unreviewed Transfer Orders.)    5,000 Units SC Every 12 Hours Scheduled --                  Meek García MD  [facility]  09/05/22  18:26 EDT

## 2022-09-05 NOTE — ANESTHESIA POSTPROCEDURE EVALUATION
Patient: Matt Blanton    Procedure Summary     Date: 09/05/22 Room / Location: [facility] OR  /  [facility] OR    Anesthesia Start: 1706 Anesthesia Stop: 1742    Procedure: INCISION AND DRAINAGE LOWER EXTREMITY (Right Ankle) Diagnosis:       Abscess of foot      (Abscess of foot [L02.619])    Surgeons: Adebayo Rios MD Provider: Jeremi Martinez MD    Anesthesia Type: general ASA Status: 3 - Emergent          Anesthesia Type: general    Vitals  Vitals Value Taken Time   /97 09/05/22 1816   Temp 97.9 °F (36.6 °C) 09/05/22 1746   Pulse 91 09/05/22 1816   Resp 20 09/05/22 1816   SpO2 99 % 09/05/22 1816           Post Anesthesia Care and Evaluation    Patient location during evaluation: PACU  Patient participation: complete - patient participated  Level of consciousness: awake and alert  Pain score: 3  Pain management: adequate    Airway patency: patent  Anesthetic complications: No anesthetic complications  PONV Status: none  Cardiovascular status: acceptable  Respiratory status: acceptable  Hydration status: acceptable

## 2022-09-05 NOTE — PLAN OF CARE
Goal Outcome Evaluation:  Plan of Care Reviewed With: patient        Progress: no change  Outcome Evaluation: Patient rested in bed throughout shift. Pt request PRN medication requested throught shift. VSS. MRI completed today. Patient voiced no further concerns at this time.

## 2022-09-05 NOTE — PROGRESS NOTES
Patient continues on day 2 vancomycin. A 7 hour post infusion vancomycin level was reported as 8.3 mg/L today. Based on this level, the AUC is calculated as 397. Will increase vancomycin dose to 1250 mg q8 hrs to target an AUC of 400-600. Will continue to follow and recheck a level when appropriate.    Thank you,    Karen Esqueda, PharmD  9/5/2022  14:27 EDT
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
## 2022-09-05 NOTE — ANESTHESIA PROCEDURE NOTES
Airway  Urgency: elective    Date/Time: 9/5/2022 5:09 PM  Airway not difficult    General Information and Staff    Patient location during procedure: OR  Anesthesiologist: Jeremi Martinez MD  CRNA/CAA: Mitra Hawley CRNA    Indications and Patient Condition  Indications for airway management: airway protection    Preoxygenated: yes  MILS maintained throughout  Mask difficulty assessment: 1 - vent by mask    Final Airway Details  Final airway type: supraglottic airway      Successful airway: classic  Size 5    Number of attempts at approach: 1  Assessment: lips, teeth, and gum same as pre-op and atraumatic intubation    Additional Comments  AOI X 1 PASS +BBS, +ETCO2, +R//F/C MOUTH TEETH GUMS SAME AS PREOP

## 2022-09-06 ENCOUNTER — APPOINTMENT (OUTPATIENT)
Dept: CARDIOLOGY | Facility: HOSPITAL | Age: 42
End: 2022-09-06

## 2022-09-06 ENCOUNTER — ANESTHESIA (OUTPATIENT)
Dept: CARDIOLOGY | Facility: HOSPITAL | Age: 42
End: 2022-09-06

## 2022-09-06 ENCOUNTER — ANESTHESIA EVENT (OUTPATIENT)
Dept: CARDIOLOGY | Facility: HOSPITAL | Age: 42
End: 2022-09-06

## 2022-09-06 LAB
ALBUMIN SERPL-MCNC: 1.75 G/DL (ref 3.5–5.2)
ALBUMIN/GLOB SERPL: 0.4 G/DL
ALP SERPL-CCNC: 187 U/L (ref 39–117)
ALT SERPL W P-5'-P-CCNC: 17 U/L (ref 1–41)
ANION GAP SERPL CALCULATED.3IONS-SCNC: 8.4 MMOL/L (ref 5–15)
AST SERPL-CCNC: 30 U/L (ref 1–40)
BACTERIA SPEC AEROBE CULT: ABNORMAL
BASOPHILS # BLD AUTO: 0.04 10*3/MM3 (ref 0–0.2)
BASOPHILS NFR BLD AUTO: 0.3 % (ref 0–1.5)
BILIRUB SERPL-MCNC: 0.5 MG/DL (ref 0–1.2)
BUN SERPL-MCNC: 10 MG/DL (ref 6–20)
BUN/CREAT SERPL: 15.6 (ref 7–25)
CALCIUM SPEC-SCNC: 8.5 MG/DL (ref 8.6–10.5)
CHLORIDE SERPL-SCNC: 104 MMOL/L (ref 98–107)
CO2 SERPL-SCNC: 19.6 MMOL/L (ref 22–29)
CREAT SERPL-MCNC: 0.64 MG/DL (ref 0.76–1.27)
DEPRECATED RDW RBC AUTO: 47.8 FL (ref 37–54)
EGFRCR SERPLBLD CKD-EPI 2021: 122.7 ML/MIN/1.73
EOSINOPHIL # BLD AUTO: 0.01 10*3/MM3 (ref 0–0.4)
EOSINOPHIL NFR BLD AUTO: 0.1 % (ref 0.3–6.2)
ERYTHROCYTE [DISTWIDTH] IN BLOOD BY AUTOMATED COUNT: 13.9 % (ref 12.3–15.4)
ERYTHROCYTE [SEDIMENTATION RATE] IN BLOOD: 93 MM/HR (ref 0–15)
GLOBULIN UR ELPH-MCNC: 4.9 GM/DL
GLUCOSE SERPL-MCNC: 177 MG/DL (ref 65–99)
GRAM STN SPEC: ABNORMAL
HCT VFR BLD AUTO: 38 % (ref 37.5–51)
HGB BLD-MCNC: 12.5 G/DL (ref 13–17.7)
IMM GRANULOCYTES # BLD AUTO: 0.13 10*3/MM3 (ref 0–0.05)
IMM GRANULOCYTES NFR BLD AUTO: 0.9 % (ref 0–0.5)
ISOLATED FROM: ABNORMAL
LYMPHOCYTES # BLD AUTO: 1.06 10*3/MM3 (ref 0.7–3.1)
LYMPHOCYTES NFR BLD AUTO: 7.6 % (ref 19.6–45.3)
MAGNESIUM SERPL-MCNC: 1.9 MG/DL (ref 1.6–2.6)
MAXIMAL PREDICTED HEART RATE: 180 BPM
MCH RBC QN AUTO: 30.8 PG (ref 26.6–33)
MCHC RBC AUTO-ENTMCNC: 32.9 G/DL (ref 31.5–35.7)
MCV RBC AUTO: 93.6 FL (ref 79–97)
MONOCYTES # BLD AUTO: 0.67 10*3/MM3 (ref 0.1–0.9)
MONOCYTES NFR BLD AUTO: 4.8 % (ref 5–12)
NEUTROPHILS NFR BLD AUTO: 12 10*3/MM3 (ref 1.7–7)
NEUTROPHILS NFR BLD AUTO: 86.3 % (ref 42.7–76)
NRBC BLD AUTO-RTO: 0 /100 WBC (ref 0–0.2)
PLATELET # BLD AUTO: 325 10*3/MM3 (ref 140–450)
PMV BLD AUTO: 10.2 FL (ref 6–12)
POTASSIUM SERPL-SCNC: 4 MMOL/L (ref 3.5–5.2)
PROT SERPL-MCNC: 6.6 G/DL (ref 6–8.5)
RBC # BLD AUTO: 4.06 10*6/MM3 (ref 4.14–5.8)
SODIUM SERPL-SCNC: 132 MMOL/L (ref 136–145)
STRESS TARGET HR: 153 BPM
WBC NRBC COR # BLD: 13.91 10*3/MM3 (ref 3.4–10.8)

## 2022-09-06 PROCEDURE — 25010000002 HEPARIN (PORCINE) PER 1000 UNITS: Performed by: PODIATRIST

## 2022-09-06 PROCEDURE — 85025 COMPLETE CBC W/AUTO DIFF WBC: CPT | Performed by: PODIATRIST

## 2022-09-06 PROCEDURE — 85652 RBC SED RATE AUTOMATED: CPT | Performed by: PODIATRIST

## 2022-09-06 PROCEDURE — 93321 DOPPLER ECHO F-UP/LMTD STD: CPT

## 2022-09-06 PROCEDURE — 99232 SBSQ HOSP IP/OBS MODERATE 35: CPT | Performed by: INTERNAL MEDICINE

## 2022-09-06 PROCEDURE — 93325 DOPPLER ECHO COLOR FLOW MAPG: CPT

## 2022-09-06 PROCEDURE — 93312 ECHO TRANSESOPHAGEAL: CPT

## 2022-09-06 PROCEDURE — 80053 COMPREHEN METABOLIC PANEL: CPT | Performed by: PODIATRIST

## 2022-09-06 PROCEDURE — 93312 ECHO TRANSESOPHAGEAL: CPT | Performed by: SPECIALIST

## 2022-09-06 PROCEDURE — 25010000002 MORPHINE PER 10 MG: Performed by: PODIATRIST

## 2022-09-06 PROCEDURE — 93325 DOPPLER ECHO COLOR FLOW MAPG: CPT | Performed by: SPECIALIST

## 2022-09-06 PROCEDURE — 83735 ASSAY OF MAGNESIUM: CPT | Performed by: PODIATRIST

## 2022-09-06 PROCEDURE — 25010000002 VANCOMYCIN 5 G RECONSTITUTED SOLUTION: Performed by: PODIATRIST

## 2022-09-06 PROCEDURE — 93321 DOPPLER ECHO F-UP/LMTD STD: CPT | Performed by: SPECIALIST

## 2022-09-06 PROCEDURE — 87040 BLOOD CULTURE FOR BACTERIA: CPT | Performed by: INTERNAL MEDICINE

## 2022-09-06 PROCEDURE — 25010000002 PROPOFOL 10 MG/ML EMULSION: Performed by: NURSE ANESTHETIST, CERTIFIED REGISTERED

## 2022-09-06 RX ORDER — PROPOFOL 10 MG/ML
VIAL (ML) INTRAVENOUS AS NEEDED
Status: DISCONTINUED | OUTPATIENT
Start: 2022-09-06 | End: 2022-09-06 | Stop reason: SURG

## 2022-09-06 RX ADMIN — MORPHINE SULFATE 1 MG: 2 INJECTION, SOLUTION INTRAMUSCULAR; INTRAVENOUS at 20:03

## 2022-09-06 RX ADMIN — MORPHINE SULFATE 1 MG: 2 INJECTION, SOLUTION INTRAMUSCULAR; INTRAVENOUS at 15:00

## 2022-09-06 RX ADMIN — HEPARIN SODIUM 5000 UNITS: 5000 INJECTION INTRAVENOUS; SUBCUTANEOUS at 20:03

## 2022-09-06 RX ADMIN — VANCOMYCIN HYDROCHLORIDE 1250 MG: 5 INJECTION, POWDER, LYOPHILIZED, FOR SOLUTION INTRAVENOUS at 09:09

## 2022-09-06 RX ADMIN — Medication 10 ML: at 08:33

## 2022-09-06 RX ADMIN — VANCOMYCIN HYDROCHLORIDE 1250 MG: 5 INJECTION, POWDER, LYOPHILIZED, FOR SOLUTION INTRAVENOUS at 01:34

## 2022-09-06 RX ADMIN — ETHYL ALCOHOL 1 EACH: 62 SWAB TOPICAL at 08:31

## 2022-09-06 RX ADMIN — VANCOMYCIN HYDROCHLORIDE 1250 MG: 5 INJECTION, POWDER, LYOPHILIZED, FOR SOLUTION INTRAVENOUS at 17:22

## 2022-09-06 RX ADMIN — SODIUM CHLORIDE 125 ML/HR: 9 INJECTION, SOLUTION INTRAVENOUS at 22:47

## 2022-09-06 RX ADMIN — MORPHINE SULFATE 1 MG: 2 INJECTION, SOLUTION INTRAMUSCULAR; INTRAVENOUS at 01:11

## 2022-09-06 RX ADMIN — PROPOFOL 100 MG: 10 INJECTION, EMULSION INTRAVENOUS at 14:07

## 2022-09-06 RX ADMIN — PROPOFOL 50 MG: 10 INJECTION, EMULSION INTRAVENOUS at 14:08

## 2022-09-06 RX ADMIN — ETHYL ALCOHOL 1 EACH: 62 SWAB TOPICAL at 22:44

## 2022-09-06 RX ADMIN — MORPHINE SULFATE 1 MG: 2 INJECTION, SOLUTION INTRAMUSCULAR; INTRAVENOUS at 05:46

## 2022-09-06 RX ADMIN — MORPHINE SULFATE 1 MG: 2 INJECTION, SOLUTION INTRAMUSCULAR; INTRAVENOUS at 10:00

## 2022-09-06 RX ADMIN — NICOTINE 1 PATCH: 14 PATCH TRANSDERMAL at 08:33

## 2022-09-06 RX ADMIN — PROPOFOL 50 MG: 10 INJECTION, EMULSION INTRAVENOUS at 14:10

## 2022-09-06 RX ADMIN — PROPOFOL 30 MG: 10 INJECTION, EMULSION INTRAVENOUS at 14:11

## 2022-09-06 RX ADMIN — Medication 10 ML: at 22:43

## 2022-09-06 NOTE — PROGRESS NOTES
PROGRESS NOTE         Patient Identification:  Name:  Matt Blanton  Age:  41 y.o.  Sex:  male  :  1980  MRN:  [id number]  Visit Number:  [id number]  Primary Care Provider:  Provider, No Known         LOS: 3 days       ----------------------------------------------------------------------------------------------------------------------  Subjective       Chief Complaints:    Abscess        Interval History:      Patient resting comfortably in bed this morning.  Currently on room air with no apparent distress.  Status post incision and drainage of the right subtalar joint and incision and drainage of the medial and lateral ankle calcaneal bone level on 2022.  Intraoperative cultures from right ankle from 2022 currently in process.  Blood culture from 22 currently in process.  Blood cultures from 2022 2 out of 2 sets positive for MRSA.  MRI of the right ankle from 2022 reports extensive soft tissue swelling and edema about the lower leg ankle and foot with 2 large complex areas of increased attenuation within the subcutaneous tissues that may represent developing abscesses, superimposed generalized soft tissue swelling that may represent underlying cellulitis, minimal ankle and subtalar joint fluid but no evidence of destructive changes to strongly support septic arthritis, evaluation of the posterior subtalar joint is limited as there is superimposed changes from apparent chronic intra-articular calcaneal fracture, mild plantar fasciitis.    Review of Systems:    Constitutional: no fever, chills and night sweats.  Generalized fatigue.  Eyes: no eye drainage, itching or redness.  HEENT: no mouth sores, dysphagia or nose bleed.  Respiratory: no for shortness of breath, cough or production of sputum.  Cardiovascular: no chest pain, no palpitations, no orthopnea.  Gastrointestinal: no nausea, vomiting or diarrhea. No abdominal pain, hematemesis or rectal bleeding.  Genitourinary: no  dysuria or polyuria.  Hematologic/lymphatic: no lymph node abnormalities, no easy bruising or easy bleeding.  Musculoskeletal: Right ankle pain and swelling  Skin: No rash and no itching.  Neurological: no loss of consciousness, no seizure, no headache.  Behavioral/Psych: no depression or suicidal ideation.  Endocrine: no hot flashes.  Immunologic: negative.    ----------------------------------------------------------------------------------------------------------------------      Objective       Current [facility] Meds:  ethyl alcohol, 1 application, Nasal, BID  heparin (porcine), 5,000 Units, Subcutaneous, Q12H  nicotine, 1 patch, Transdermal, Q24H  sodium chloride, 10 mL, Intravenous, Q12H  vancomycin, 1,250 mg, Intravenous, Q8H      sodium chloride, 125 mL/hr, Last Rate: 125 mL/hr (09/05/22 1845)      ----------------------------------------------------------------------------------------------------------------------    Vital Signs:  Temp:  [97.9 °F (36.6 °C)-99 °F (37.2 °C)] 98 °F (36.7 °C)  Heart Rate:  [56-94] 56  Resp:  [18-22] 18  BP: (135-169)/() 143/86  Mean Arterial Pressure (Non-Invasive) for the past 24 hrs (Last 3 readings):   Noninvasive MAP (mmHg)   09/05/22 1400 109     SpO2 Percentage    09/05/22 1811 09/05/22 1816 09/06/22 0600   SpO2: 99% 99% 98%     SpO2:  [97 %-100 %] 98 %  on  Flow (L/min):  [2-4] 2;   Device (Oxygen Therapy): room air    Body mass index is 25.17 kg/m².  Wt Readings from Last 3 Encounters:   09/06/22 93.8 kg (206 lb 12.8 oz)        Intake/Output Summary (Last 24 hours) at 9/6/2022 1208  Last data filed at 9/5/2022 2310  Gross per 24 hour   Intake 4742.11 ml   Output 1000 ml   Net 3742.11 ml     NPO Diet NPO Type: Ice Chips  ----------------------------------------------------------------------------------------------------------------------      Physical Exam:    Constitutional:  Well-developed and well-nourished.  No respiratory distress.  Poor hygiene.   HENT:  Head:  Normocephalic and atraumatic.  Mouth:  Moist mucous membranes.    Eyes:  Conjunctivae and EOM are normal.  No scleral icterus.  Neck:  Neck supple.  No JVD present.    Cardiovascular:  Normal rate, regular rhythm and normal heart sounds with no murmur. No edema.  Pulmonary/Chest:  No respiratory distress, no wheezes, no crackles, with normal breath sounds and good air movement.  Abdominal:  Soft.  Bowel sounds are normal.  No distension and no tenderness.   Musculoskeletal:  No edema, no tenderness, and no deformity.  No swelling or redness of joints.  Neurological:  Alert and oriented to person, place, and time.  No facial droop.  No slurred speech.   Skin:  Skin is warm and dry.  No rash noted.  No pallor.  Right ankle edema with surgical dressing in place.  Psychiatric:  Normal mood and affect.  Behavior is normal.        ----------------------------------------------------------------------------------------------------------------------            Results from last 7 days   Lab Units 09/06/22  0054 09/05/22  1538 09/05/22  0858 09/05/22  0341 09/04/22  0253 09/03/22  1942 09/03/22  1710   CRP mg/dL  --   --  10.65*  --  16.74*  --  23.79*   LACTATE mmol/L  --  1.2  --   --   --   --  2.0   WBC 10*3/mm3 13.91*  --   --  13.12* 14.37*   < >  --    HEMOGLOBIN g/dL 12.5*  --   --  14.8 13.5   < >  --    HEMATOCRIT % 38.0  --   --  43.6 39.3   < >  --    MCV fL 93.6  --   --  93.2 90.3   < >  --    MCHC g/dL 32.9  --   --  33.9 34.4   < >  --    PLATELETS 10*3/mm3 325  --   --  296 287   < >  --    INR   --   --   --   --   --   --  1.15*    < > = values in this interval not displayed.     Results from last 7 days   Lab Units 09/06/22  0054 09/05/22  0341 09/04/22  1736 09/04/22  0253 09/03/22  2210   SODIUM mmol/L 132* 131* 131* 131* 127*   POTASSIUM mmol/L 4.0 3.8 4.0  4.0 3.1* 3.3*   MAGNESIUM mg/dL 1.9  --   --   --  1.9   CHLORIDE mmol/L 104 99 97* 91* 91*   CO2 mmol/L 19.6* 21.5* 26.1 26.4 24.2   BUN mg/dL 10  12 16 17 17   CREATININE mg/dL 0.64* 0.75* 0.86 0.85 0.79   CALCIUM mg/dL 8.5* 9.0 9.1 8.7 9.0   GLUCOSE mg/dL 177* 102* 111* 107* 109*   ALBUMIN g/dL 1.75* 1.85*  --  1.86*  --    BILIRUBIN mg/dL 0.5 1.0  --  1.5*  --    ALK PHOS U/L 187* 116  --  161*  --    AST (SGOT) U/L 30 34  --  38  --    ALT (SGPT) U/L 17 13  --  18  --    Estimated Creatinine Clearance: 203.6 mL/min (A) (by C-G formula based on SCr of 0.64 mg/dL (L)).  No results found for: AMMONIA    No results found for: HGBA1C, POCGLU  No results found for: HGBA1C  No results found for: TSH, FREET4    Blood Culture   Date Value Ref Range Status   09/04/2022 Staphylococcus aureus, MRSA (C)  Final     Comment:     Infectious disease consultation is highly recommended to rule out distant foci of infection.  Methicillin resistant Staphylococcus aureus, Patient may be an isolation risk.   09/04/2022 Staphylococcus aureus, MRSA (C)  Final     Comment:     Infectious disease consultation is highly recommended to rule out distant foci of infection.  Methicillin resistant Staphylococcus aureus, Patient may be an isolation risk.   09/03/2022 Staphylococcus aureus, MRSA (C)  Final     Comment:     Infectious disease consultation is highly recommended to rule out distant foci of infection.  Methicillin resistant Staphylococcus aureus, Patient may be an isolation risk.   09/03/2022 Staphylococcus aureus, MRSA (C)  Final     Comment:     Infectious disease consultation is highly recommended to rule out distant foci of infection.  Methicillin resistant Staphylococcus aureus, Patient may be an isolation risk.     No results found for: URINECX  Wound Culture   Date Value Ref Range Status   09/03/2022 Moderate growth (3+) Staphylococcus aureus, MRSA (A)  Final     Comment:     Methicillin resistant Staphylococcus aureus, Patient may be an isolation risk.     No results found for: STOOLCX  No results found for: RESPCX  Pain Management Panel     Pain Management Panel Latest  Ref Rng & Units 9/3/2022    AMPHETAMINES SCREEN, URINE Negative Negative    BARBITURATES SCREEN Negative Negative    BENZODIAZEPINE SCREEN, URINE Negative Negative    BUPRENORPHINEUR Negative Negative    COCAINE SCREEN, URINE Negative Negative    METHADONE SCREEN, URINE Negative Negative    METHAMPHETAMINEUR Negative Negative            ----------------------------------------------------------------------------------------------------------------------  Imaging Results (Last 24 Hours)     Procedure Component Value Units Date/Time    MRI Ankle Right Without Contrast [[id number]] Collected: 09/05/22 1505     Updated: 09/05/22 1507    Narrative:      MRI Ankle RT WO    INDICATION:    Reported substance abuse. Clinical concern for abscess and cellulitis right lower extremity.    TECHNIQUE:   MRI of the right ankle without IV contrast.    COMPARISON:    CT right lower extremity 9/3/2022    FINDINGS:  Study limited due to lack of contrast and limited sequences. Extensive soft tissue swelling about the lower leg ankle and foot with at least 2 large complex confluent areas of increased T2 signal. In the posterior ankle medial to the Achilles tendon  there is a 3.9 x 2 x 7 cm collection which could represent a developing abscess. Along the lateral ankle anterior lateral to the fibula and peroneal tendons there is a 4.5 x 2.5 x 7.6 cm complex collection which could also represent a developing abscess.    Small ankle and subtalar joint effusions could be reactive but underlying infection not entirely excluded. No evidence of ankle joint destruction. Deformity of the posterior facet of the calcaneus compatible with likely old calcaneal fracture. No  destructive changes within the subtalar joint to suggest septic arthritis.    Moderate amount of tendinosis Achilles tendon particularly at the distal tendon insertion but no tear. Peroneus longus tendinosis without tear. Medial flexor and visualized anterior extensor tendons appear  normal. Nonvisualization of the anterior  talofibular ligament likely the sequela of chronic tear.    Mild thickening and edema within the central medial cord of plantar fascia may reflect plantar fasciitis. Intrinsic foot musculature unremarkable.      Impression:      Extensive soft tissue swelling and edema about the lower leg ankle and foot with 2 large complex areas of increased attenuation within the subcutaneous tissues as detailed above which in the clinical setting may represent developing abscesses.  Superimposed generalized soft tissue swelling likely reflecting underlying cellulitis.    Minimal ankle and subtalar joint fluid but no evidence of destructive changes to strongly support septic arthritis. Evaluation of the posterior subtalar joint limited as there is a superimposed changes from apparent chronic intra-articular calcaneal  fracture. Recent CT suggested healing with some degree of malunion.    Moderate Achilles tendinosis without tear. Mild plantar fasciitis.    Findings called and discussed with the patient's nurse on 3 N. at the time of this dictation.    Signer Name: KACY Knapp MD   Signed: 9/5/2022 3:05 PM   Workstation Name: [id number]    [facility]          ----------------------------------------------------------------------------------------------------------------------    Pertinent Infectious Disease Results    Wound culture of the right foot from 9/3/2022  reports growth of MRSA.  Blood cultures from 9/3/2022 2 out of 2 sets positive for MRSA.  Lactic acid normal at 2 on admission.  COVID-19 and influenza PCR negative.  HIV 1 and 2 nonreactive.  MRSA PCR positive.  Arterial Doppler of the right lower extremity from 9/3/2022 reports no evidence of right lower extremity arterial occlusion, low resistance arterial flow throughout the right leg compatible with downstream vasodilation in the setting of infection/inflammation, potential head or generous  fluids soft tissue collection image of the medial right ankle.  CT of the right lower extremity from 9/3/2022 reports extensive soft tissue swelling about the medial and lateral areas of confluent edema but no definitive abscess or definite enhancing fluid collection, small punctuate gas collection and medial and posterior soft tissues, chronic appearing deformity of the calcaneus compatible with depressed fracture of the posterior facet of the calcaneus and depression of the sustentaculum talus, fracture appears healed but with some degree of malalignment.      Status post incision and drainage of the right subtalar joint and incision and drainage of the medial and lateral ankle calcaneal bone level on 9/5/2022.  Intraoperative cultures from right ankle from 9/5/2022 currently in process.  Blood culture from 9/5/22 currently in process.  Blood cultures from 9/4/2022 2 out of 2 sets positive for MRSA.  MRI of the right ankle from 9/5/2022 reports extensive soft tissue swelling and edema about the lower leg ankle and foot with 2 large complex areas of increased attenuation within the subcutaneous tissues that may represent developing abscesses, superimposed generalized soft tissue swelling that may represent underlying cellulitis, minimal ankle and subtalar joint fluid but no evidence of destructive changes to strongly support septic arthritis, evaluation of the posterior subtalar joint is limited as there is superimposed changes from apparent chronic intra-articular calcaneal fracture, mild plantar fasciitis.      Assessment/Plan     MRSA bacteremia  Right ankle septic arthritis with abscess  IV drug use    I saw and examined the patient myself this morning with MANJIT Franz.    Patient is status post right ankle I&D and washout of the joint with culture showing growth of MRSA including several blood cultures from 9/3/2022 and 9/4/2022 along with MRSA screen positive and wound culture from the right foot  right ankle showing growth of MRSA.    I am concerned about the possibility of superimposed infective endocarditis due to the patient's history and persistent bacteremia and extensive right ankle septic arthritis and recommend SUMMER as soon as feasible to rule out vegetation and patient will require a prolonged course of IV antibiotic therapy x4 to 6 weeks for his septic arthritis with possible de-escalation to oral regimen if SUMMER shows no evidence of vegetation.  At this time patient seems to be comfortable with no evidence of acute distress and unfortunately suffers from bad hygiene and was counseled.          Code Status:   Code Status and Medical Interventions:   Ordered at: 09/03/22 0379     Level Of Support Discussed With:    Patient     Code Status (Patient has no pulse and is not breathing):    CPR (Attempt to Resuscitate)     Medical Interventions (Patient has pulse or is breathing):    Full Support       MANJIT Franz  09/06/22  12:08 EDT     Electronically signed by MANJIT Franz, 09/06/22, 12:13 PM EDT.    Electronically signed by Nely Mesa MD, 09/06/22, 12:57 PM EDT.

## 2022-09-06 NOTE — CASE MANAGEMENT/SOCIAL WORK
Discharge Planning Assessment   Alan     Patient Name: Matt Blanton  MRN: [id number]  Today's Date: 9/6/2022    Admit Date: 9/3/2022     Discharge Needs Assessment     Row Name 09/06/22 1907       Living Environment    People in Home sibling(s)    Primary Care Provided by self    Provides Primary Care For no one    Family Caregiver if Needed sibling(s)    Quality of Family Relationships unable to assess    Able to Return to Prior Arrangements yes       Transition Planning    Patient/Family Anticipates Transition to home with family    Transportation Anticipated family or friend will provide       Discharge Needs Assessment    Equipment Currently Used at Home none    Discharge Facility/Level of Care Needs --  Pt did not utilize home health services prior to admission.               Discharge Plan     Row Name 09/06/22 1908       Plan    Plan Pt lives with his brother ([address] [place], [place]) and he plans to return home at discharge. Pt did not utilize home health services prior to admission. Pt does not utilize DME. Pt does not have a PCP. Pt does not have a POA or living will. Pt denies needing substance abuse treatment and resources. SS to follow and assist as needed with discharge planning.    Patient/Family in Agreement with Plan yes              Expected Discharge Date and Time     Expected Discharge Date Expected Discharge Time    Sep 7, 2022          Demographic Summary     Row Name 09/06/22 1905       General Information    Referral Source nursing    Reason for Consult --  SS received consult for IVDA, LE wound, S/P I&D. SUMMER today r/o endocarditis. Assist with safe dc plan, Tx abuse resources if desired. SS spoke to pt at bedside.               Substance Abuse     Row Name 09/06/22 1906       Substance Use    Substance Use Comment UDS is positive for oxycodone; pt denies needing substance abuse resources and treatment              KATERINA Gallardo

## 2022-09-06 NOTE — ANESTHESIA PREPROCEDURE EVALUATION
Anesthesia Evaluation     Patient summary reviewed and Nursing notes reviewed   no history of anesthetic complications:  NPO Solid Status: > 8 hours  NPO Liquid Status: > 8 hours           Airway   Mallampati: II  TM distance: >3 FB  Neck ROM: full  No difficulty expected  Dental - normal exam   (+) poor dentition    Pulmonary - normal exam   (+) a smoker Current Smoked day of surgery,   Cardiovascular - negative cardio ROS    ECG reviewed    (+) peripheral edema (erythema, swelling Right ankle),     ROS comment: TTE 9/5/22    Interpretation Summary    · Estimated left ventricular EF = 65% Left ventricular ejection fraction appears to be 61 - 65%. Left ventricular systolic function is normal.  · Left ventricular diastolic function was normal.  · Estimated right ventricular systolic pressure from tricuspid regurgitation is normal (<35 mmHg).  · No significant valvular abnormality. No definite vegetation or mass seen  · No pericardial effusion  · No prev echo         Neuro/Psych- negative ROS  GI/Hepatic/Renal/Endo - negative ROS     Musculoskeletal     (+) chronic pain,   Abdominal  - normal exam    Bowel sounds: normal.   Substance History   (+) alcohol use, drug use (IVDU Opioids)     OB/GYN negative ob/gyn ROS         Other - negative ROS                         Anesthesia Plan    ASA 3       total IV anesthesia  intravenous induction     Anesthetic plan, risks, benefits, and alternatives have been provided, discussed and informed consent has been obtained with: patient.    Plan discussed with attending.        CODE STATUS:         Lab Results   Component Value Date    WBC 13.91 (H) 09/06/2022    HGB 12.5 (L) 09/06/2022    HCT 38.0 09/06/2022    MCV 93.6 09/06/2022     09/06/2022         Lab Results   Component Value Date    GLUCOSE 177 (H) 09/06/2022    BUN 10 09/06/2022    CREATININE 0.64 (L) 09/06/2022    BCR 15.6 09/06/2022    K 4.0 09/06/2022    CO2 19.6 (L) 09/06/2022    CALCIUM 8.5 (L) 09/06/2022     ALBUMIN 1.75 (L) 09/06/2022    AST 30 09/06/2022    ALT 17 09/06/2022

## 2022-09-06 NOTE — PROGRESS NOTES
[facility] HOSPITALIST PROGRESS NOTE     Patient Identification:  Name:  Matt Blanton  Age:  41 y.o.  Sex:  male  :  1980  MRN:  [id number]  Visit Number:  [id number]  ROOM: [address] [place] [address]     Primary Care Provider:  Provider, No Known    Length of stay in inpatient status:  3    Subjective     Chief Compliant:    Chief Complaint   Patient presents with   • Abscess       History of Presenting Illness:    Patient denies any new complaints. He reports feeling much better since I&D. Leg not nearly as sore. He reports he is ready to eat after SUMMER.     ROS:  Otherwise 10 point ROS negative other than documented above in HPI.     Objective     Current Hospital Meds:ethyl alcohol, 1 application, Nasal, BID  heparin (porcine), 5,000 Units, Subcutaneous, Q12H  nicotine, 1 patch, Transdermal, Q24H  sodium chloride, 10 mL, Intravenous, Q12H  vancomycin, 1,250 mg, Intravenous, Q8H    sodium chloride, 125 mL/hr, Last Rate: 125 mL/hr (22 1406)        Current Antimicrobial Therapy:  Anti-Infectives (From admission, onward)    Ordered     Dose/Rate Route Frequency Start Stop    22 1505  vancomycin 1250 mg/250 mL 0.9% NS IVPB ([facility])        Ordering Provider: Adebayo Rios MD    1,250 mg  over 60 Minutes Intravenous Every 8 Hours 22 1800 09/10/22 1759    22 1740  piperacillin-tazobactam (ZOSYN) IVPB 3.375 g in 100 mL NS VTB        Ordering Provider: Salina Strickland DO    3.375 g Intravenous Once 22 1742 22 19122 171  vancomycin (VANCOCIN) 1 g in sodium chloride 0.9 % 250 mL IVPB        Ordering Provider: Salina Strickland DO    1 g  over 60 Minutes Intravenous Once 22 17122 191        Current Diuretic Therapy:  Diuretics (From admission, onward)    None        ----------------------------------------------------------------------------------------------------------------------  Vital Signs:  Temp:  [97.5 °F (36.4 °C)-99 °F (37.2 °C)] 97.8 °F (36.6  °C)  Heart Rate:  [54-94] 56  Resp:  [17-22] 20  BP: (131-169)/() 159/97  SpO2:  [97 %-100 %] 99 %  on  Flow (L/min):  [2-4] 2;   Device (Oxygen Therapy): room air  Body mass index is 25.08 kg/m².    Wt Readings from Last 3 Encounters:   09/06/22 93.4 kg (206 lb)     Intake & Output (last 3 days)       09/03 0701 09/04 0700 09/04 0701 09/05 0700 09/05 0701 09/06 0700 09/06 0701 09/07 0700    P.O. 0 1420 1360     I.V. (mL/kg)  1437.5 (15.4) 3382.1 (36.1)     IV Piggyback 1350       Total Intake(mL/kg) 1350 (14.3) 2857.5 (30.6) 4742.1 (50.6)     Urine (mL/kg/hr)  2425 (1.1) 2000 (0.9) 825 (1)    Stool   0     Total Output  2425 2000 825    Net +1350 +432.5 +2742.1 -825            Stool Unmeasured Occurrence   1 x         Diet Regular  ----------------------------------------------------------------------------------------------------------------------  Physical exam:  Constitutional:  Well-developed and well-nourished.  No respiratory distress.      HENT:  Head:  Normocephalic and atraumatic.  Mouth:  Moist mucous membranes.    Eyes:  Conjunctivae and EOM are normal. No scleral icterus.    Neck:  Neck supple.  No JVD present.    Cardiovascular:  Normal rate, regular rhythm and normal heart sounds with no murmur.  Pulmonary/Chest:  No respiratory distress, no wheezes, no crackles, with normal breath sounds and good air movement.  Abdominal:  Soft.  Bowel sounds are normal.  No distension and no tenderness.   Musculoskeletal:  R ankle bandaging in place, no surrounding redness or warmth.   Neurological:  Alert and oriented to person, place, and time.  No cranial nerve deficit.  No tongue deviation.  No facial droop.  No slurred speech.   Skin:  Skin is warm and dry. No rash noted. No pallor.   Peripheral vascular:  Pulses in all 4 extremities with no clubbing, no cyanosis, no edema.  ----------------------------------------------------------------------------------------------------------------------  Tele:     ----------------------------------------------------------------------------------------------------------------------  Results from last 7 days   Lab Units 09/06/22  0054 09/05/22  1538 09/05/22  0858 09/05/22  0341 09/04/22  0253 09/03/22  1942 09/03/22  1710   CRP mg/dL  --   --  10.65*  --  16.74*  --  23.79*   LACTATE mmol/L  --  1.2  --   --   --   --  2.0   WBC 10*3/mm3 13.91*  --   --  13.12* 14.37*   < >  --    HEMOGLOBIN g/dL 12.5*  --   --  14.8 13.5   < >  --    HEMATOCRIT % 38.0  --   --  43.6 39.3   < >  --    MCV fL 93.6  --   --  93.2 90.3   < >  --    MCHC g/dL 32.9  --   --  33.9 34.4   < >  --    PLATELETS 10*3/mm3 325  --   --  296 287   < >  --    INR   --   --   --   --   --   --  1.15*    < > = values in this interval not displayed.         Results from last 7 days   Lab Units 09/06/22  0054 09/05/22  0341 09/04/22  1736 09/04/22  0253 09/03/22  2210   SODIUM mmol/L 132* 131* 131* 131* 127*   POTASSIUM mmol/L 4.0 3.8 4.0  4.0 3.1* 3.3*   MAGNESIUM mg/dL 1.9  --   --   --  1.9   CHLORIDE mmol/L 104 99 97* 91* 91*   CO2 mmol/L 19.6* 21.5* 26.1 26.4 24.2   BUN mg/dL 10 12 16 17 17   CREATININE mg/dL 0.64* 0.75* 0.86 0.85 0.79   CALCIUM mg/dL 8.5* 9.0 9.1 8.7 9.0   GLUCOSE mg/dL 177* 102* 111* 107* 109*   ALBUMIN g/dL 1.75* 1.85*  --  1.86*  --    BILIRUBIN mg/dL 0.5 1.0  --  1.5*  --    ALK PHOS U/L 187* 116  --  161*  --    AST (SGOT) U/L 30 34  --  38  --    ALT (SGPT) U/L 17 13  --  18  --    Estimated Creatinine Clearance: 202.7 mL/min (A) (by C-G formula based on SCr of 0.64 mg/dL (L)).  No results found for: AMMONIA              No results found for: HGBA1C, POCGLU  No results found for: TSH, FREET4  No results found for: PREGTESTUR, PREGSERUM, HCG, HCGQUANT  Pain Management Panel     Pain Management Panel Latest Ref Rng & Units 9/3/2022    AMPHETAMINES SCREEN, URINE Negative Negative    BARBITURATES SCREEN Negative Negative    BENZODIAZEPINE SCREEN, URINE Negative Negative     BUPRENORPHINEUR Negative Negative    COCAINE SCREEN, URINE Negative Negative    METHADONE SCREEN, URINE Negative Negative    METHAMPHETAMINEUR Negative Negative        Brief Urine Lab Results     None        Blood Culture   Date Value Ref Range Status   09/04/2022 Staphylococcus aureus, MRSA (C)  Final     Comment:     Infectious disease consultation is highly recommended to rule out distant foci of infection.  Methicillin resistant Staphylococcus aureus, Patient may be an isolation risk.   09/04/2022 Staphylococcus aureus, MRSA (C)  Final     Comment:     Infectious disease consultation is highly recommended to rule out distant foci of infection.  Methicillin resistant Staphylococcus aureus, Patient may be an isolation risk.   09/03/2022 Staphylococcus aureus, MRSA (C)  Final     Comment:     Infectious disease consultation is highly recommended to rule out distant foci of infection.  Methicillin resistant Staphylococcus aureus, Patient may be an isolation risk.   09/03/2022 Staphylococcus aureus, MRSA (C)  Final     Comment:     Infectious disease consultation is highly recommended to rule out distant foci of infection.  Methicillin resistant Staphylococcus aureus, Patient may be an isolation risk.     No results found for: URINECX  Wound Culture   Date Value Ref Range Status   09/05/2022 Culture in progress  Preliminary   09/05/2022 Culture in progress  Preliminary   09/03/2022 Moderate growth (3+) Staphylococcus aureus, MRSA (A)  Final     Comment:     Methicillin resistant Staphylococcus aureus, Patient may be an isolation risk.     No results found for: STOOLCX  No results found for: RESPCX  No results found for: AFBCX  Results from last 7 days   Lab Units 09/06/22  0054 09/05/22  1538 09/05/22  0858 09/04/22  0253 09/03/22  1710   LACTATE mmol/L  --  1.2  --   --  2.0   SED RATE mm/hr 93*  --   --   --  128*   CRP mg/dL  --   --  10.65* 16.74* 23.79*       I have personally looked at the labs and they are  summarized above.  ----------------------------------------------------------------------------------------------------------------------  Detailed radiology reports for the last 24 hours:    Imaging Results (Last 24 Hours)     ** No results found for the last 24 hours. **        Assessment & Plan      #Sepsis in setting of MRSA bacteremia  #Cellulitis of right ankles vs. Septic arthritis   #Hx of IV drug use   #Dental caries   - Blood cultures from 9/2 and 9/4 positive for MRSA in 2/2 bottles.   - Wound culture from 9/3 also growing MRSA  - Will repeat blood cultures   - Podiatry took for the I&D of R ankle on 9/5. MRI did not reveal evidence of septic joint.   - TTE did not reveal endocarditis but patient high risk. SUMMER could not rule out endocarditis on right coronary cusp. Suspect we will need to empirically treat.   - Will continue vanc      #Hepatitis C positive antibody   - Outpatient f/u. LFTs normalized.      #Hypokalemia  - Replace as needed      F: PO  E: Replace as needed   N: Regular      Code status: Full      Dispo: Pending clinical improvement     VTE Prophylaxis:   Mechanical Order History:     None      Pharmalogical Order History:      Ordered     Dose Route Frequency Stop    09/04/22 0028  heparin (porcine) 5000 UNIT/ML injection 5,000 Units         5,000 Units SC Every 12 Hours Scheduled --                  Meek García MD  [facility]  09/06/22  16:02 EDT

## 2022-09-06 NOTE — ANESTHESIA POSTPROCEDURE EVALUATION
Patient: Matt Blanton    Procedure Summary     Date: 09/06/22 Room / Location: [facility] NONINVASIVE LAB    Anesthesia Start: 1406 Anesthesia Stop: 1420    Procedure: ADULT TRANSESOPHAGEAL ECHO (SUMMER) W/ CONT IF NECESSARY PER PROTOCOL Diagnosis: (Endocarditis)    Scheduled Providers:  Provider: Sagar Franklin MD    Anesthesia Type: general ASA Status: 3          Anesthesia Type: general    Vitals  Vitals Value Taken Time   /101 09/06/22 1710   Temp 97.4 °F (36.3 °C) 09/06/22 1710   Pulse 77 09/06/22 1710   Resp 20 09/06/22 1710   SpO2 97 % 09/06/22 1710           Post Anesthesia Care and Evaluation    Patient location during evaluation: PHASE II  Patient participation: complete - patient participated  Level of consciousness: awake and alert  Pain score: 0  Pain management: adequate    Airway patency: patent  Anesthetic complications: No anesthetic complications    Cardiovascular status: acceptable  Respiratory status: acceptable  Hydration status: acceptable

## 2022-09-06 NOTE — PLAN OF CARE
Goal Outcome Evaluation:              Outcome Evaluation: pt has rested in bed, has had pain managed with prn pain meds, pt had SUMMER done today and tolerated well, no s/s of endocarditis, no acute changes, will continue plan of care.

## 2022-09-07 LAB
ANION GAP SERPL CALCULATED.3IONS-SCNC: 8.7 MMOL/L (ref 5–15)
BASOPHILS # BLD AUTO: 0.05 10*3/MM3 (ref 0–0.2)
BASOPHILS NFR BLD AUTO: 0.4 % (ref 0–1.5)
BUN SERPL-MCNC: 12 MG/DL (ref 6–20)
BUN/CREAT SERPL: 17.6 (ref 7–25)
CALCIUM SPEC-SCNC: 8.2 MG/DL (ref 8.6–10.5)
CHLORIDE SERPL-SCNC: 104 MMOL/L (ref 98–107)
CO2 SERPL-SCNC: 19.3 MMOL/L (ref 22–29)
CREAT SERPL-MCNC: 0.68 MG/DL (ref 0.76–1.27)
DEPRECATED RDW RBC AUTO: 49.3 FL (ref 37–54)
EGFRCR SERPLBLD CKD-EPI 2021: 120.5 ML/MIN/1.73
EOSINOPHIL # BLD AUTO: 0.15 10*3/MM3 (ref 0–0.4)
EOSINOPHIL NFR BLD AUTO: 1.2 % (ref 0.3–6.2)
ERYTHROCYTE [DISTWIDTH] IN BLOOD BY AUTOMATED COUNT: 14.1 % (ref 12.3–15.4)
GLUCOSE SERPL-MCNC: 112 MG/DL (ref 65–99)
HCT VFR BLD AUTO: 37 % (ref 37.5–51)
HGB BLD-MCNC: 12.1 G/DL (ref 13–17.7)
IMM GRANULOCYTES # BLD AUTO: 0.32 10*3/MM3 (ref 0–0.05)
IMM GRANULOCYTES NFR BLD AUTO: 2.6 % (ref 0–0.5)
LYMPHOCYTES # BLD AUTO: 2.44 10*3/MM3 (ref 0.7–3.1)
LYMPHOCYTES NFR BLD AUTO: 19.6 % (ref 19.6–45.3)
MCH RBC QN AUTO: 31.1 PG (ref 26.6–33)
MCHC RBC AUTO-ENTMCNC: 32.7 G/DL (ref 31.5–35.7)
MCV RBC AUTO: 95.1 FL (ref 79–97)
MONOCYTES # BLD AUTO: 0.74 10*3/MM3 (ref 0.1–0.9)
MONOCYTES NFR BLD AUTO: 5.9 % (ref 5–12)
NEUTROPHILS NFR BLD AUTO: 70.3 % (ref 42.7–76)
NEUTROPHILS NFR BLD AUTO: 8.74 10*3/MM3 (ref 1.7–7)
NRBC BLD AUTO-RTO: 0 /100 WBC (ref 0–0.2)
PLATELET # BLD AUTO: 383 10*3/MM3 (ref 140–450)
PMV BLD AUTO: 9.7 FL (ref 6–12)
POTASSIUM SERPL-SCNC: 3.9 MMOL/L (ref 3.5–5.2)
RBC # BLD AUTO: 3.89 10*6/MM3 (ref 4.14–5.8)
SODIUM SERPL-SCNC: 132 MMOL/L (ref 136–145)
VANCOMYCIN TROUGH SERPL-MCNC: 13.1 MCG/ML (ref 5–20)
WBC NRBC COR # BLD: 12.44 10*3/MM3 (ref 3.4–10.8)

## 2022-09-07 PROCEDURE — 25010000002 MORPHINE PER 10 MG: Performed by: PODIATRIST

## 2022-09-07 PROCEDURE — 25010000002 KETOROLAC TROMETHAMINE PER 15 MG: Performed by: PHYSICIAN ASSISTANT

## 2022-09-07 PROCEDURE — 99232 SBSQ HOSP IP/OBS MODERATE 35: CPT | Performed by: INTERNAL MEDICINE

## 2022-09-07 PROCEDURE — 85025 COMPLETE CBC W/AUTO DIFF WBC: CPT | Performed by: INTERNAL MEDICINE

## 2022-09-07 PROCEDURE — 80202 ASSAY OF VANCOMYCIN: CPT

## 2022-09-07 PROCEDURE — 25010000002 VANCOMYCIN 5 G RECONSTITUTED SOLUTION: Performed by: PODIATRIST

## 2022-09-07 PROCEDURE — 80048 BASIC METABOLIC PNL TOTAL CA: CPT | Performed by: INTERNAL MEDICINE

## 2022-09-07 PROCEDURE — 99231 SBSQ HOSP IP/OBS SF/LOW 25: CPT | Performed by: INTERNAL MEDICINE

## 2022-09-07 PROCEDURE — 25010000002 HEPARIN (PORCINE) PER 1000 UNITS: Performed by: PODIATRIST

## 2022-09-07 RX ORDER — IBUPROFEN 400 MG/1
400 TABLET ORAL EVERY 4 HOURS PRN
Status: DISCONTINUED | OUTPATIENT
Start: 2022-09-07 | End: 2022-09-08 | Stop reason: HOSPADM

## 2022-09-07 RX ORDER — KETOROLAC TROMETHAMINE 30 MG/ML
15 INJECTION, SOLUTION INTRAMUSCULAR; INTRAVENOUS ONCE
Status: COMPLETED | OUTPATIENT
Start: 2022-09-07 | End: 2022-09-07

## 2022-09-07 RX ADMIN — MORPHINE SULFATE 1 MG: 2 INJECTION, SOLUTION INTRAMUSCULAR; INTRAVENOUS at 00:49

## 2022-09-07 RX ADMIN — NICOTINE 1 PATCH: 14 PATCH TRANSDERMAL at 08:35

## 2022-09-07 RX ADMIN — ETHYL ALCOHOL 1 EACH: 62 SWAB TOPICAL at 21:28

## 2022-09-07 RX ADMIN — HEPARIN SODIUM 5000 UNITS: 5000 INJECTION INTRAVENOUS; SUBCUTANEOUS at 21:29

## 2022-09-07 RX ADMIN — MORPHINE SULFATE 1 MG: 2 INJECTION, SOLUTION INTRAMUSCULAR; INTRAVENOUS at 17:01

## 2022-09-07 RX ADMIN — HEPARIN SODIUM 5000 UNITS: 5000 INJECTION INTRAVENOUS; SUBCUTANEOUS at 08:37

## 2022-09-07 RX ADMIN — KETOROLAC TROMETHAMINE 15 MG: 30 INJECTION, SOLUTION INTRAMUSCULAR; INTRAVENOUS at 04:14

## 2022-09-07 RX ADMIN — IBUPROFEN 400 MG: 400 TABLET, FILM COATED ORAL at 18:31

## 2022-09-07 RX ADMIN — VANCOMYCIN HYDROCHLORIDE 1250 MG: 5 INJECTION, POWDER, LYOPHILIZED, FOR SOLUTION INTRAVENOUS at 17:24

## 2022-09-07 RX ADMIN — MORPHINE SULFATE 1 MG: 2 INJECTION, SOLUTION INTRAMUSCULAR; INTRAVENOUS at 13:20

## 2022-09-07 RX ADMIN — Medication 10 ML: at 21:29

## 2022-09-07 RX ADMIN — ETHYL ALCOHOL 1 EACH: 62 SWAB TOPICAL at 08:34

## 2022-09-07 RX ADMIN — MORPHINE SULFATE 1 MG: 2 INJECTION, SOLUTION INTRAMUSCULAR; INTRAVENOUS at 05:08

## 2022-09-07 RX ADMIN — VANCOMYCIN HYDROCHLORIDE 1250 MG: 5 INJECTION, POWDER, LYOPHILIZED, FOR SOLUTION INTRAVENOUS at 10:40

## 2022-09-07 RX ADMIN — MORPHINE SULFATE 1 MG: 2 INJECTION, SOLUTION INTRAMUSCULAR; INTRAVENOUS at 21:33

## 2022-09-07 RX ADMIN — MORPHINE SULFATE 1 MG: 2 INJECTION, SOLUTION INTRAMUSCULAR; INTRAVENOUS at 09:13

## 2022-09-07 RX ADMIN — Medication 10 ML: at 08:35

## 2022-09-07 RX ADMIN — VANCOMYCIN HYDROCHLORIDE 1250 MG: 5 INJECTION, POWDER, LYOPHILIZED, FOR SOLUTION INTRAVENOUS at 02:59

## 2022-09-07 NOTE — PROGRESS NOTES
[facility] HOSPITALIST PROGRESS NOTE     Patient Identification:  Name:  Matt Blanton  Age:  41 y.o.  Sex:  male  :  1980  MRN:  [id number]  Visit Number:  [id number]  ROOM: [facility]/     Primary Care Provider:  Provider, No Known    Length of stay in inpatient status:  4    Subjective     Chief Compliant:    Chief Complaint   Patient presents with   • Abscess       History of Presenting Illness:    Patient reports feeling better. He notes still having pain and that he probably has a tolerance for opiates.       ROS:  Otherwise 10 point ROS negative other than documented above in HPI.     Objective     Current Hospital Meds:ethyl alcohol, 1 application, Nasal, BID  heparin (porcine), 5,000 Units, Subcutaneous, Q12H  nicotine, 1 patch, Transdermal, Q24H  sodium chloride, 10 mL, Intravenous, Q12H  vancomycin, 1,250 mg, Intravenous, Q8H    sodium chloride, 125 mL/hr, Last Rate: 125 mL/hr (22 2247)        Current Antimicrobial Therapy:  Anti-Infectives (From admission, onward)    Ordered     Dose/Rate Route Frequency Start Stop    22 1505  vancomycin 1250 mg/250 mL 0.9% NS IVPB ([facility])        Ordering Provider: Adebayo Rios MD    1,250 mg  over 60 Minutes Intravenous Every 8 Hours 22 1800 09/10/22 17522 174  piperacillin-tazobactam (ZOSYN) IVPB 3.375 g in 100 mL NS VTB        Ordering Provider: Salina Strickland DO    3.375 g Intravenous Once 22 1742 22 19122 171  vancomycin (VANCOCIN) 1 g in sodium chloride 0.9 % 250 mL IVPB        Ordering Provider: Salina Strickland DO    1 g  over 60 Minutes Intravenous Once 22 17122 191        Current Diuretic Therapy:  Diuretics (From admission, onward)    None        ----------------------------------------------------------------------------------------------------------------------  Vital Signs:  Temp:  [97.4 °F (36.3 °C)-98.9 °F (37.2 °C)] 97.9 °F (36.6 °C)  Heart Rate:  [54-90] 71  Resp:   [18-20] 20  BP: (148-167)/() 154/91  SpO2:  [97 %-99 %] 97 %  on  Flow (L/min):  [2] 2;   Device (Oxygen Therapy): room air  Body mass index is 25.08 kg/m².    Wt Readings from Last 3 Encounters:   09/06/22 93.4 kg (206 lb)     Intake & Output (last 3 days)       09/04 0701 09/05 0700 09/05 0701 09/06 0700 09/06 0701 09/07 0700 09/07 0701 09/08 0700    P.O. 1420 1360 1000 1440    I.V. (mL/kg) 1437.5 (15.4) 3382.1 (36.1) 1500 (16.1)     IV Piggyback   250     Total Intake(mL/kg) 2857.5 (30.6) 4742.1 (50.6) 2750 (29.4) 1440 (15.4)    Urine (mL/kg/hr) 2425 (1.1) 2000 (0.9) 3125 (1.4) 2525 (3.2)    Stool  0 0     Total Output 2425 2000 3125 2525    Net +432.5 +2742.1 -375 -1085            Stool Unmeasured Occurrence  1 x 1 x         Diet Regular  ----------------------------------------------------------------------------------------------------------------------  Physical exam:  Constitutional:  Well-developed and well-nourished.  No respiratory distress.      HENT:  Head:  Normocephalic and atraumatic.  Mouth:  Moist mucous membranes.    Eyes:  Conjunctivae and EOM are normal. No scleral icterus.    Neck:  Neck supple.  No JVD present.    Cardiovascular:  Normal rate, regular rhythm and normal heart sounds with no murmur.  Pulmonary/Chest:  No respiratory distress, no wheezes, no crackles, with normal breath sounds and good air movement.  Abdominal:  Soft.  Bowel sounds are normal.  No distension and no tenderness.   Musculoskeletal:  R foot bandaging in place with no surrounding redness or drainage.   Neurological:  Alert and oriented to person, place, and time.  No cranial nerve deficit.  No tongue deviation.  No facial droop.  No slurred speech.   Skin:  Skin is warm and dry. No rash noted. No pallor.   Peripheral vascular:  Pulses in all 4 extremities with no clubbing, no cyanosis, no  edema.  ----------------------------------------------------------------------------------------------------------------------  Tele:    ----------------------------------------------------------------------------------------------------------------------  Results from last 7 days   Lab Units 09/07/22  0424 09/06/22  0054 09/05/22  1538 09/05/22  0858 09/05/22  0341 09/04/22  0253 09/03/22  1942 09/03/22  1710   CRP mg/dL  --   --   --  10.65*  --  16.74*  --  23.79*   LACTATE mmol/L  --   --  1.2  --   --   --   --  2.0   WBC 10*3/mm3 12.44* 13.91*  --   --  13.12* 14.37*   < >  --    HEMOGLOBIN g/dL 12.1* 12.5*  --   --  14.8 13.5   < >  --    HEMATOCRIT % 37.0* 38.0  --   --  43.6 39.3   < >  --    MCV fL 95.1 93.6  --   --  93.2 90.3   < >  --    MCHC g/dL 32.7 32.9  --   --  33.9 34.4   < >  --    PLATELETS 10*3/mm3 383 325  --   --  296 287   < >  --    INR   --   --   --   --   --   --   --  1.15*    < > = values in this interval not displayed.         Results from last 7 days   Lab Units 09/07/22  0424 09/06/22  0054 09/05/22  0341 09/04/22  1736 09/04/22  0253 09/03/22  2210   SODIUM mmol/L 132* 132* 131*   < > 131* 127*   POTASSIUM mmol/L 3.9 4.0 3.8   < > 3.1* 3.3*   MAGNESIUM mg/dL  --  1.9  --   --   --  1.9   CHLORIDE mmol/L 104 104 99   < > 91* 91*   CO2 mmol/L 19.3* 19.6* 21.5*   < > 26.4 24.2   BUN mg/dL 12 10 12   < > 17 17   CREATININE mg/dL 0.68* 0.64* 0.75*   < > 0.85 0.79   CALCIUM mg/dL 8.2* 8.5* 9.0   < > 8.7 9.0   GLUCOSE mg/dL 112* 177* 102*   < > 107* 109*   ALBUMIN g/dL  --  1.75* 1.85*  --  1.86*  --    BILIRUBIN mg/dL  --  0.5 1.0  --  1.5*  --    ALK PHOS U/L  --  187* 116  --  161*  --    AST (SGOT) U/L  --  30 34  --  38  --    ALT (SGPT) U/L  --  17 13  --  18  --     < > = values in this interval not displayed.   Estimated Creatinine Clearance: 190.8 mL/min (A) (by C-G formula based on SCr of 0.68 mg/dL (L)).  No results found for: AMMONIA              No results found for: HGBA1C,  POCGLU  No results found for: TSH, FREET4  No results found for: PREGTESTUR, PREGSERUM, HCG, HCGQUANT  Pain Management Panel     Pain Management Panel Latest Ref Rng & Units 9/3/2022    AMPHETAMINES SCREEN, URINE Negative Negative    BARBITURATES SCREEN Negative Negative    BENZODIAZEPINE SCREEN, URINE Negative Negative    BUPRENORPHINEUR Negative Negative    COCAINE SCREEN, URINE Negative Negative    METHADONE SCREEN, URINE Negative Negative    METHAMPHETAMINEUR Negative Negative        Brief Urine Lab Results     None        Blood Culture   Date Value Ref Range Status   09/05/2022 No growth at 24 hours  Preliminary   09/04/2022 Staphylococcus aureus, MRSA (C)  Final     Comment:     Infectious disease consultation is highly recommended to rule out distant foci of infection.  Methicillin resistant Staphylococcus aureus, Patient may be an isolation risk.   09/04/2022 Staphylococcus aureus, MRSA (C)  Final     Comment:     Infectious disease consultation is highly recommended to rule out distant foci of infection.  Methicillin resistant Staphylococcus aureus, Patient may be an isolation risk.   09/03/2022 Staphylococcus aureus, MRSA (C)  Final     Comment:     Infectious disease consultation is highly recommended to rule out distant foci of infection.  Methicillin resistant Staphylococcus aureus, Patient may be an isolation risk.   09/03/2022 Staphylococcus aureus, MRSA (C)  Final     Comment:     Infectious disease consultation is highly recommended to rule out distant foci of infection.  Methicillin resistant Staphylococcus aureus, Patient may be an isolation risk.     No results found for: URINECX  Wound Culture   Date Value Ref Range Status   09/05/2022 Light growth (2+) Staphylococcus aureus, MRSA (A)  Preliminary     Comment:     Methicillin resistant Staphylococcus aureus, Patient may be an isolation risk.   09/05/2022 Scant growth (1+) Staphylococcus aureus, MRSA (A)  Preliminary     Comment:     Methicillin  resistant Staphylococcus aureus, Patient may be an isolation risk.   09/03/2022 Moderate growth (3+) Staphylococcus aureus, MRSA (A)  Final     Comment:     Methicillin resistant Staphylococcus aureus, Patient may be an isolation risk.     No results found for: STOOLCX  No results found for: RESPCX  No results found for: AFBCX  Results from last 7 days   Lab Units 09/06/22  0054 09/05/22  1538 09/05/22  0858 09/04/22  0253 09/03/22  1710   LACTATE mmol/L  --  1.2  --   --  2.0   SED RATE mm/hr 93*  --   --   --  128*   CRP mg/dL  --   --  10.65* 16.74* 23.79*       I have personally looked at the labs and they are summarized above.  ----------------------------------------------------------------------------------------------------------------------  Detailed radiology reports for the last 24 hours:    Imaging Results (Last 24 Hours)     ** No results found for the last 24 hours. **        Assessment & Plan      #Sepsis in setting of MRSA bacteremia  #Cellulitis of right ankles vs. Septic arthritis   #Hx of IV drug use   #Dental caries   - Blood cultures from 9/2 and 9/4 positive for MRSA in 2/2 bottles.   - Wound culture from 9/3 also growing MRSA  - Repeat blood cultures 9/6 NGTD  - Podiatry took for the I&D of R ankle on 9/5. MRI did not reveal evidence of septic joint.   - TTE did not reveal endocarditis but patient high risk. SUMMER could not rule out endocarditis on right coronary cusp. Suspect we will need to empirically treat for 6 weeks.   - Will continue vanc   - ID following, appreciate recs. Recommend repeat SUMMER toward end of abx therapy.     #Hepatitis C positive antibody   - Outpatient f/u. LFTs normalized.      #Hypokalemia  - Replace as needed      F: PO  E: Replace as needed   N: Regular      Code status: Full      Dispo: Pending clinical improvement. [facility] consulted. Once patient clears cultures will get PICC.    VTE Prophylaxis:   Mechanical Order History:     None      Pharmalogical Order History:       Ordered     Dose Route Frequency Stop    09/04/22 0028  heparin (porcine) 5000 UNIT/ML injection 5,000 Units         5,000 Units SC Every 12 Hours Scheduled --                Meek García MD  [facility]  09/07/22  15:22 EDT

## 2022-09-07 NOTE — PROGRESS NOTES
PROGRESS NOTE         Patient Identification:  Name:  Matt Blanton  Age:  41 y.o.  Sex:  male  :  1980  MRN:  [id number]  Visit Number:  [id number]  Primary Care Provider:  Provider, No Known         LOS: 4 days       ----------------------------------------------------------------------------------------------------------------------  Subjective       Chief Complaints:    Abscess        Interval History:      Discussed with primary RN.  Patient resting in bed this morning.  Right ankle less swollen with packing in place and serosanguineous/brownish drainage noted.  Pain overall much better.  SUMMER from 2022 could not rule out small vegetation on the right coronary cusp of the aortic valve.  Blood cultures from 2022 show no growth thus far.  Blood cultures from 2022 currently in process.  WBC improving at 12.44.    Review of Systems:    Constitutional: no fever, chills and night sweats.   Eyes: no eye drainage, itching or redness.  HEENT: no mouth sores, dysphagia or nose bleed.  Respiratory: no for shortness of breath, cough or production of sputum.  Cardiovascular: no chest pain, no palpitations, no orthopnea.  Gastrointestinal: no nausea, vomiting or diarrhea. No abdominal pain, hematemesis or rectal bleeding.  Genitourinary: no dysuria or polyuria.  Hematologic/lymphatic: no lymph node abnormalities, no easy bruising or easy bleeding.  Musculoskeletal: Right ankle pain and swelling overall improved  Skin: No rash and no itching.  Neurological: no loss of consciousness, no seizure, no headache.  Behavioral/Psych: no depression or suicidal ideation.  Endocrine: no hot flashes.  Immunologic: negative.    ----------------------------------------------------------------------------------------------------------------------      Objective       Current [facility] Meds:  ethyl alcohol, 1 application, Nasal, BID  heparin (porcine), 5,000 Units, Subcutaneous, Q12H  nicotine, 1 patch,  Transdermal, Q24H  sodium chloride, 10 mL, Intravenous, Q12H  vancomycin, 1,250 mg, Intravenous, Q8H      sodium chloride, 125 mL/hr, Last Rate: 125 mL/hr (09/06/22 2247)      ----------------------------------------------------------------------------------------------------------------------    Vital Signs:  Temp:  [97.4 °F (36.3 °C)-98.9 °F (37.2 °C)] 98.1 °F (36.7 °C)  Heart Rate:  [54-90] 64  Resp:  [17-21] 18  BP: (131-167)/() 156/80  No data found.  SpO2 Percentage    09/06/22 1640 09/06/22 1710 09/07/22 0600   SpO2: 99% 97% 97%     SpO2:  [97 %-100 %] 97 %  on  Flow (L/min):  [2] 2;   Device (Oxygen Therapy): room air    Body mass index is 25.08 kg/m².  Wt Readings from Last 3 Encounters:   09/06/22 93.4 kg (206 lb)        Intake/Output Summary (Last 24 hours) at 9/7/2022 0918  Last data filed at 9/7/2022 0700  Gross per 24 hour   Intake 2750 ml   Output 3125 ml   Net -375 ml     Diet Regular  ----------------------------------------------------------------------------------------------------------------------      Physical Exam:    Constitutional:  Well-developed and well-nourished.  No respiratory distress.  Poor hygiene.   HENT:  Head: Normocephalic and atraumatic.  Mouth:  Moist mucous membranes.    Eyes:  Conjunctivae and EOM are normal.  No scleral icterus.  Neck:  Neck supple.  No JVD present.    Cardiovascular:  Normal rate, regular rhythm and normal heart sounds with no murmur. No edema.  Pulmonary/Chest:  No respiratory distress, no wheezes, no crackles, with normal breath sounds and good air movement.  Abdominal:  Soft.  Bowel sounds are normal.  No distension and no tenderness.   Musculoskeletal:  No edema, no tenderness, and no deformity.  No swelling or redness of joints.  Neurological:  Alert and oriented to person, place, and time.  No facial droop.  No slurred speech.   Skin:  Skin is warm and dry.  No rash noted.  No pallor.  Right ankle less swollen, packing in place with  serosanguineous/brownish drainage.  Psychiatric:  Normal mood and affect.  Behavior is normal.        ----------------------------------------------------------------------------------------------------------------------            Results from last 7 days   Lab Units 09/07/22 0424 09/06/22 0054 09/05/22  1538 09/05/22  0858 09/05/22 0341 09/04/22 0253 09/03/22  1942 09/03/22  1710   CRP mg/dL  --   --   --  10.65*  --  16.74*  --  23.79*   LACTATE mmol/L  --   --  1.2  --   --   --   --  2.0   WBC 10*3/mm3 12.44* 13.91*  --   --  13.12* 14.37*   < >  --    HEMOGLOBIN g/dL 12.1* 12.5*  --   --  14.8 13.5   < >  --    HEMATOCRIT % 37.0* 38.0  --   --  43.6 39.3   < >  --    MCV fL 95.1 93.6  --   --  93.2 90.3   < >  --    MCHC g/dL 32.7 32.9  --   --  33.9 34.4   < >  --    PLATELETS 10*3/mm3 383 325  --   --  296 287   < >  --    INR   --   --   --   --   --   --   --  1.15*    < > = values in this interval not displayed.     Results from last 7 days   Lab Units 09/07/22 0424 09/06/22 0054 09/05/22 0341 09/04/22  1736 09/04/22 0253 09/03/22  2210   SODIUM mmol/L 132* 132* 131*   < > 131* 127*   POTASSIUM mmol/L 3.9 4.0 3.8   < > 3.1* 3.3*   MAGNESIUM mg/dL  --  1.9  --   --   --  1.9   CHLORIDE mmol/L 104 104 99   < > 91* 91*   CO2 mmol/L 19.3* 19.6* 21.5*   < > 26.4 24.2   BUN mg/dL 12 10 12   < > 17 17   CREATININE mg/dL 0.68* 0.64* 0.75*   < > 0.85 0.79   CALCIUM mg/dL 8.2* 8.5* 9.0   < > 8.7 9.0   GLUCOSE mg/dL 112* 177* 102*   < > 107* 109*   ALBUMIN g/dL  --  1.75* 1.85*  --  1.86*  --    BILIRUBIN mg/dL  --  0.5 1.0  --  1.5*  --    ALK PHOS U/L  --  187* 116  --  161*  --    AST (SGOT) U/L  --  30 34  --  38  --    ALT (SGPT) U/L  --  17 13  --  18  --     < > = values in this interval not displayed.   Estimated Creatinine Clearance: 190.8 mL/min (A) (by C-G formula based on SCr of 0.68 mg/dL (L)).  No results found for: AMMONIA    No results found for: HGBA1C, POCGLU  No results found for:  HGBA1C  No results found for: TSH, FREET4    Blood Culture   Date Value Ref Range Status   09/04/2022 Staphylococcus aureus, MRSA (C)  Final     Comment:     Infectious disease consultation is highly recommended to rule out distant foci of infection.  Methicillin resistant Staphylococcus aureus, Patient may be an isolation risk.   09/04/2022 Staphylococcus aureus, MRSA (C)  Final     Comment:     Infectious disease consultation is highly recommended to rule out distant foci of infection.  Methicillin resistant Staphylococcus aureus, Patient may be an isolation risk.   09/03/2022 Staphylococcus aureus, MRSA (C)  Final     Comment:     Infectious disease consultation is highly recommended to rule out distant foci of infection.  Methicillin resistant Staphylococcus aureus, Patient may be an isolation risk.   09/03/2022 Staphylococcus aureus, MRSA (C)  Final     Comment:     Infectious disease consultation is highly recommended to rule out distant foci of infection.  Methicillin resistant Staphylococcus aureus, Patient may be an isolation risk.     No results found for: URINECX  Wound Culture   Date Value Ref Range Status   09/03/2022 Moderate growth (3+) Staphylococcus aureus, MRSA (A)  Final     Comment:     Methicillin resistant Staphylococcus aureus, Patient may be an isolation risk.     No results found for: STOOLCX  No results found for: RESPCX  Pain Management Panel     Pain Management Panel Latest Ref Rng & Units 9/3/2022    AMPHETAMINES SCREEN, URINE Negative Negative    BARBITURATES SCREEN Negative Negative    BENZODIAZEPINE SCREEN, URINE Negative Negative    BUPRENORPHINEUR Negative Negative    COCAINE SCREEN, URINE Negative Negative    METHADONE SCREEN, URINE Negative Negative    METHAMPHETAMINEUR Negative Negative            ----------------------------------------------------------------------------------------------------------------------  Imaging Results (Last 24 Hours)     ** No results found for the  last 24 hours. **          ----------------------------------------------------------------------------------------------------------------------    Pertinent Infectious Disease Results    Wound culture of the right foot from 9/3/2022  reports growth of MRSA.  Blood cultures from 9/3/2022 2 out of 2 sets positive for MRSA.  Lactic acid normal at 2 on admission.  COVID-19 and influenza PCR negative.  HIV 1 and 2 nonreactive.  MRSA PCR positive.  Arterial Doppler of the right lower extremity from 9/3/2022 reports no evidence of right lower extremity arterial occlusion, low resistance arterial flow throughout the right leg compatible with downstream vasodilation in the setting of infection/inflammation, potential head or generous fluids soft tissue collection image of the medial right ankle.  CT of the right lower extremity from 9/3/2022 reports extensive soft tissue swelling about the medial and lateral areas of confluent edema but no definitive abscess or definite enhancing fluid collection, small punctuate gas collection and medial and posterior soft tissues, chronic appearing deformity of the calcaneus compatible with depressed fracture of the posterior facet of the calcaneus and depression of the sustentaculum talus, fracture appears healed but with some degree of malalignment.    Status post incision and drainage of the right subtalar joint and incision and drainage of the medial and lateral ankle calcaneal bone level on 9/5/2022.  Intraoperative cultures from right ankle from 9/5/2022 currently in process.   Blood cultures from 9/4/2022 2 out of 2 sets positive for MRSA.  MRI of the right ankle from 9/5/2022 reports extensive soft tissue swelling and edema about the lower leg ankle and foot with 2 large complex areas of increased attenuation within the subcutaneous tissues that may represent developing abscesses, superimposed generalized soft tissue swelling that may represent underlying cellulitis, minimal  ankle and subtalar joint fluid but no evidence of destructive changes to strongly support septic arthritis, evaluation of the posterior subtalar joint is limited as there is superimposed changes from apparent chronic intra-articular calcaneal fracture, mild plantar fasciitis.      Assessment/Plan     MRSA bacteremia  Right ankle septic arthritis  Right ankle abscess  IV drug use    I saw and examined the patient myself this morning discussed the plan of care with primary RN and MANJIT Franz and I examined his wound that showed significant less swelling but continues to have serosanguineous brownish drainage but without any odor and resolving erythema.    His SUMMER from 9/6/2022 could not rule out a small vegetation on the right coronary cusp of the aortic valve and I do recommend to repeat SUMMER towards the end of his therapy.    Finally blood cultures from 9/5/2022 show no growth so far and blood cultures from 9/6/2022 are currently in process.    Clinically patient is doing better with resolved sepsis and WBC is improving at 12.44 but I continue to be concerned about the amount of packing required for his wound.    Patient is going to require IV antibiotic therapy for 6 weeks that could be eventually de-escalated to oral regimen towards the end of his therapy.        Code Status:   Code Status and Medical Interventions:   Ordered at: 09/03/22 0261     Level Of Support Discussed With:    Patient     Code Status (Patient has no pulse and is not breathing):    CPR (Attempt to Resuscitate)     Medical Interventions (Patient has pulse or is breathing):    Full Support       MANJIT Franz  09/07/22  09:18 EDT     Electronically signed by MANJIT Franz, 09/07/22, 9:21 AM EDT.    Electronically signed by Nely Mesa MD, 09/07/22, 1:35 PM EDT.

## 2022-09-07 NOTE — PLAN OF CARE
Goal Outcome Evaluation:  Plan of Care Reviewed With: patient        Progress: improving  Outcome Evaluation: Pt resting in bed. Vital signs stable. Pt C/O pain, PRN medications given. Interventions effective per pt. Dressing changed on right foot, see orders. No acute changes. Will continue to monitor.

## 2022-09-07 NOTE — PROGRESS NOTES
Vancomycin trough level obtained this AM was reported at 13.1mg/L.  This is consistent with an AUC of around 450-500.  Recommend continuing current dose of 1250mg iv q8hrs.  Pharmacy will continue to follow.  Thank you.

## 2022-09-07 NOTE — PLAN OF CARE
Goal Outcome Evaluation:           Progress: improving  Outcome Evaluation: pt resting in bed, wound care done per orders, pts pain has been managed with prn pain meds, no acute changes, will continue plan of care.

## 2022-09-07 NOTE — CASE MANAGEMENT/SOCIAL WORK
Discharge Planning Assessment  [facility]     Patient Name: Matt Blanton  MRN: [id number]  Today's Date: 9/7/2022    Admit Date: 9/3/2022       Discharge Plan     Row Name 09/07/22 1438       Plan    Plan SS spoke to pt and provided housing list. Pt is agreeable to [facility] admission. Dr. García ordered [facility] consult. SS notified [facility] per Sheyla Flores. SS to follow.              Continued Care and Services - Admitted Since 9/3/2022     Destination     Service Provider Request Status Selected Services Address Phone Fax Patient Preferred    [facility] [place]  Pending - No Request Sent N/A [address] [place] [address] [phone] --              Expected Discharge Date and Time     Expected Discharge Date Expected Discharge Time    Sep 10, 2022         KATERINA Gallardo

## 2022-09-08 ENCOUNTER — HOSPITAL ENCOUNTER (OUTPATIENT)
Facility: HOSPITAL | Age: 42
Discharge: HOME | End: 2022-10-07
Attending: INTERNAL MEDICINE | Admitting: INTERNAL MEDICINE

## 2022-09-08 VITALS
HEART RATE: 70 BPM | OXYGEN SATURATION: 98 % | WEIGHT: 206.8 LBS | TEMPERATURE: 98 F | RESPIRATION RATE: 18 BRPM | HEIGHT: 76 IN | SYSTOLIC BLOOD PRESSURE: 140 MMHG | DIASTOLIC BLOOD PRESSURE: 83 MMHG | BODY MASS INDEX: 25.18 KG/M2

## 2022-09-08 LAB
ALBUMIN SERPL-MCNC: 1.9 G/DL (ref 3.5–5.2)
ALBUMIN SERPL-MCNC: 2.07 G/DL (ref 3.5–5.2)
ALBUMIN/GLOB SERPL: 0.4 G/DL
ALBUMIN/GLOB SERPL: 0.5 G/DL
ALP SERPL-CCNC: 100 U/L (ref 39–117)
ALP SERPL-CCNC: 82 U/L (ref 39–117)
ALT SERPL W P-5'-P-CCNC: 29 U/L (ref 1–41)
ALT SERPL W P-5'-P-CCNC: 34 U/L (ref 1–41)
ANION GAP SERPL CALCULATED.3IONS-SCNC: 7.9 MMOL/L (ref 5–15)
ANION GAP SERPL CALCULATED.3IONS-SCNC: 8.3 MMOL/L (ref 5–15)
AST SERPL-CCNC: 40 U/L (ref 1–40)
AST SERPL-CCNC: 42 U/L (ref 1–40)
BACTERIA SPEC AEROBE CULT: ABNORMAL
BACTERIA SPEC AEROBE CULT: ABNORMAL
BASOPHILS # BLD AUTO: 0.06 10*3/MM3 (ref 0–0.2)
BASOPHILS NFR BLD AUTO: 0.6 % (ref 0–1.5)
BILIRUB SERPL-MCNC: 0.4 MG/DL (ref 0–1.2)
BILIRUB SERPL-MCNC: 0.4 MG/DL (ref 0–1.2)
BUN SERPL-MCNC: 10 MG/DL (ref 6–20)
BUN SERPL-MCNC: 14 MG/DL (ref 6–20)
BUN/CREAT SERPL: 15.9 (ref 7–25)
BUN/CREAT SERPL: 18.4 (ref 7–25)
CALCIUM SPEC-SCNC: 8.4 MG/DL (ref 8.6–10.5)
CALCIUM SPEC-SCNC: 8.7 MG/DL (ref 8.6–10.5)
CHLORIDE SERPL-SCNC: 100 MMOL/L (ref 98–107)
CHLORIDE SERPL-SCNC: 106 MMOL/L (ref 98–107)
CO2 SERPL-SCNC: 19.7 MMOL/L (ref 22–29)
CO2 SERPL-SCNC: 22.1 MMOL/L (ref 22–29)
CREAT SERPL-MCNC: 0.63 MG/DL (ref 0.76–1.27)
CREAT SERPL-MCNC: 0.76 MG/DL (ref 0.76–1.27)
CRP SERPL-MCNC: 2.37 MG/DL (ref 0–0.5)
DEPRECATED RDW RBC AUTO: 46.7 FL (ref 37–54)
DEPRECATED RDW RBC AUTO: 47.9 FL (ref 37–54)
EGFRCR SERPLBLD CKD-EPI 2021: 116.5 ML/MIN/1.73
EGFRCR SERPLBLD CKD-EPI 2021: 123.3 ML/MIN/1.73
EOSINOPHIL # BLD AUTO: 0.16 10*3/MM3 (ref 0–0.4)
EOSINOPHIL # BLD MANUAL: 0.12 10*3/MM3 (ref 0–0.4)
EOSINOPHIL NFR BLD AUTO: 1.5 % (ref 0.3–6.2)
EOSINOPHIL NFR BLD MANUAL: 1 % (ref 0.3–6.2)
ERYTHROCYTE [DISTWIDTH] IN BLOOD BY AUTOMATED COUNT: 13.7 % (ref 12.3–15.4)
ERYTHROCYTE [DISTWIDTH] IN BLOOD BY AUTOMATED COUNT: 13.8 % (ref 12.3–15.4)
FLUAV RNA RESP QL NAA+PROBE: NOT DETECTED
FLUBV RNA RESP QL NAA+PROBE: NOT DETECTED
GLOBULIN UR ELPH-MCNC: 4.2 GM/DL
GLOBULIN UR ELPH-MCNC: 4.7 GM/DL
GLUCOSE SERPL-MCNC: 113 MG/DL (ref 65–99)
GLUCOSE SERPL-MCNC: 94 MG/DL (ref 65–99)
GRAM STN SPEC: ABNORMAL
HCT VFR BLD AUTO: 36.5 % (ref 37.5–51)
HCT VFR BLD AUTO: 39.8 % (ref 37.5–51)
HGB BLD-MCNC: 11.9 G/DL (ref 13–17.7)
HGB BLD-MCNC: 13.3 G/DL (ref 13–17.7)
HYPOCHROMIA BLD QL: ABNORMAL
IMM GRANULOCYTES # BLD AUTO: 0.47 10*3/MM3 (ref 0–0.05)
IMM GRANULOCYTES NFR BLD AUTO: 4.4 % (ref 0–0.5)
LYMPHOCYTES # BLD AUTO: 2.63 10*3/MM3 (ref 0.7–3.1)
LYMPHOCYTES # BLD MANUAL: 2.47 10*3/MM3 (ref 0.7–3.1)
LYMPHOCYTES NFR BLD AUTO: 24.6 % (ref 19.6–45.3)
LYMPHOCYTES NFR BLD MANUAL: 7 % (ref 5–12)
MCH RBC QN AUTO: 30.7 PG (ref 26.6–33)
MCH RBC QN AUTO: 31.1 PG (ref 26.6–33)
MCHC RBC AUTO-ENTMCNC: 32.6 G/DL (ref 31.5–35.7)
MCHC RBC AUTO-ENTMCNC: 33.4 G/DL (ref 31.5–35.7)
MCV RBC AUTO: 93 FL (ref 79–97)
MCV RBC AUTO: 94.3 FL (ref 79–97)
METAMYELOCYTES NFR BLD MANUAL: 1 % (ref 0–0)
MONOCYTES # BLD AUTO: 0.8 10*3/MM3 (ref 0.1–0.9)
MONOCYTES # BLD: 0.86 10*3/MM3 (ref 0.1–0.9)
MONOCYTES NFR BLD AUTO: 7.5 % (ref 5–12)
MYELOCYTES NFR BLD MANUAL: 1 % (ref 0–0)
NEUTROPHILS # BLD AUTO: 8.64 10*3/MM3 (ref 1.7–7)
NEUTROPHILS NFR BLD AUTO: 6.56 10*3/MM3 (ref 1.7–7)
NEUTROPHILS NFR BLD AUTO: 61.4 % (ref 42.7–76)
NEUTROPHILS NFR BLD MANUAL: 69 % (ref 42.7–76)
NEUTS BAND NFR BLD MANUAL: 1 % (ref 0–5)
NRBC BLD AUTO-RTO: 0 /100 WBC (ref 0–0.2)
PLAT MORPH BLD: NORMAL
PLATELET # BLD AUTO: 342 10*3/MM3 (ref 140–450)
PLATELET # BLD AUTO: 385 10*3/MM3 (ref 140–450)
PMV BLD AUTO: 10.7 FL (ref 6–12)
PMV BLD AUTO: 9.4 FL (ref 6–12)
POTASSIUM SERPL-SCNC: 3.8 MMOL/L (ref 3.5–5.2)
POTASSIUM SERPL-SCNC: 3.9 MMOL/L (ref 3.5–5.2)
PROT SERPL-MCNC: 6.1 G/DL (ref 6–8.5)
PROT SERPL-MCNC: 6.8 G/DL (ref 6–8.5)
RBC # BLD AUTO: 3.87 10*6/MM3 (ref 4.14–5.8)
RBC # BLD AUTO: 4.28 10*6/MM3 (ref 4.14–5.8)
SARS-COV-2 RNA RESP QL NAA+PROBE: NOT DETECTED
SODIUM SERPL-SCNC: 130 MMOL/L (ref 136–145)
SODIUM SERPL-SCNC: 134 MMOL/L (ref 136–145)
TOXIC GRANULATION: ABNORMAL
VARIANT LYMPHS NFR BLD MANUAL: 20 % (ref 19.6–45.3)
WBC NRBC COR # BLD: 10.68 10*3/MM3 (ref 3.4–10.8)
WBC NRBC COR # BLD: 12.34 10*3/MM3 (ref 3.4–10.8)

## 2022-09-08 PROCEDURE — 25010000002 MORPHINE PER 10 MG: Performed by: PODIATRIST

## 2022-09-08 PROCEDURE — 25010000002 VANCOMYCIN 5 G RECONSTITUTED SOLUTION: Performed by: PODIATRIST

## 2022-09-08 PROCEDURE — 84134 ASSAY OF PREALBUMIN: CPT | Performed by: INTERNAL MEDICINE

## 2022-09-08 PROCEDURE — C1751 CATH, INF, PER/CENT/MIDLINE: HCPCS

## 2022-09-08 PROCEDURE — 86140 C-REACTIVE PROTEIN: CPT | Performed by: INTERNAL MEDICINE

## 2022-09-08 PROCEDURE — 80053 COMPREHEN METABOLIC PANEL: CPT | Performed by: INTERNAL MEDICINE

## 2022-09-08 PROCEDURE — 97161 PT EVAL LOW COMPLEX 20 MIN: CPT

## 2022-09-08 PROCEDURE — 85025 COMPLETE CBC W/AUTO DIFF WBC: CPT | Performed by: INTERNAL MEDICINE

## 2022-09-08 PROCEDURE — 87636 SARSCOV2 & INF A&B AMP PRB: CPT | Performed by: INTERNAL MEDICINE

## 2022-09-08 PROCEDURE — 36410 VNPNXR 3YR/> PHY/QHP DX/THER: CPT

## 2022-09-08 PROCEDURE — 99232 SBSQ HOSP IP/OBS MODERATE 35: CPT | Performed by: INTERNAL MEDICINE

## 2022-09-08 PROCEDURE — 93010 ELECTROCARDIOGRAM REPORT: CPT | Performed by: INTERNAL MEDICINE

## 2022-09-08 PROCEDURE — 25010000002 HEPARIN (PORCINE) PER 1000 UNITS: Performed by: PODIATRIST

## 2022-09-08 PROCEDURE — C9803 HOPD COVID-19 SPEC COLLECT: HCPCS | Performed by: INTERNAL MEDICINE

## 2022-09-08 PROCEDURE — 99239 HOSP IP/OBS DSCHRG MGMT >30: CPT | Performed by: INTERNAL MEDICINE

## 2022-09-08 PROCEDURE — 85007 BL SMEAR W/DIFF WBC COUNT: CPT | Performed by: INTERNAL MEDICINE

## 2022-09-08 PROCEDURE — 97165 OT EVAL LOW COMPLEX 30 MIN: CPT

## 2022-09-08 PROCEDURE — 93005 ELECTROCARDIOGRAM TRACING: CPT | Performed by: INTERNAL MEDICINE

## 2022-09-08 RX ORDER — HYDROCODONE BITARTRATE AND ACETAMINOPHEN 5; 325 MG/1; MG/1
1 TABLET ORAL EVERY 6 HOURS PRN
Refills: 0
Start: 2022-09-08

## 2022-09-08 RX ORDER — IBUPROFEN 400 MG/1
400 TABLET ORAL EVERY 4 HOURS PRN
Start: 2022-09-08

## 2022-09-08 RX ORDER — NICOTINE 21 MG/24HR
1 PATCH, TRANSDERMAL 24 HOURS TRANSDERMAL
Start: 2022-09-09

## 2022-09-08 RX ADMIN — HEPARIN SODIUM 5000 UNITS: 5000 INJECTION INTRAVENOUS; SUBCUTANEOUS at 09:30

## 2022-09-08 RX ADMIN — ETHYL ALCOHOL 1 EACH: 62 SWAB TOPICAL at 09:30

## 2022-09-08 RX ADMIN — VANCOMYCIN HYDROCHLORIDE 1250 MG: 5 INJECTION, POWDER, LYOPHILIZED, FOR SOLUTION INTRAVENOUS at 09:29

## 2022-09-08 RX ADMIN — MORPHINE SULFATE 1 MG: 2 INJECTION, SOLUTION INTRAMUSCULAR; INTRAVENOUS at 14:39

## 2022-09-08 RX ADMIN — Medication 10 ML: at 09:29

## 2022-09-08 RX ADMIN — NICOTINE 1 PATCH: 14 PATCH TRANSDERMAL at 09:37

## 2022-09-08 RX ADMIN — VANCOMYCIN HYDROCHLORIDE 1250 MG: 5 INJECTION, POWDER, LYOPHILIZED, FOR SOLUTION INTRAVENOUS at 02:17

## 2022-09-08 RX ADMIN — MORPHINE SULFATE 1 MG: 2 INJECTION, SOLUTION INTRAMUSCULAR; INTRAVENOUS at 01:33

## 2022-09-08 RX ADMIN — MORPHINE SULFATE 1 MG: 2 INJECTION, SOLUTION INTRAMUSCULAR; INTRAVENOUS at 06:03

## 2022-09-08 RX ADMIN — MORPHINE SULFATE 1 MG: 2 INJECTION, SOLUTION INTRAMUSCULAR; INTRAVENOUS at 10:17

## 2022-09-08 RX ADMIN — VANCOMYCIN HYDROCHLORIDE 1250 MG: 5 INJECTION, POWDER, LYOPHILIZED, FOR SOLUTION INTRAVENOUS at 17:27
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
## 2022-09-08 NOTE — PROGRESS NOTES
PROGRESS NOTE         Patient Identification:  Name:  Matt Blanton  Age:  41 y.o.  Sex:  male  :  1980  MRN:  [id number]  Visit Number:  [id number]  Primary Care Provider:  Provider, No Known         LOS: 5 days       ----------------------------------------------------------------------------------------------------------------------  Subjective       Chief Complaints:    Abscess        Interval History:      The patient is sitting up in bed on room air in no apparent distress.  Continues to complain of right ankle pain, especially with dressing changes, but otherwise has no new complaints at this time.  Currently in Ace dressing this morning.  Nurse reports no issues overnight.  Leukocytosis is resolved.  Blood cultures on 2022 are so far showing no growth.  1 out of 1 blood culture on 2022 is so far showing no growth.  Wound cultures of the right ankle on 2022 are so far showing MRSA.    Review of Systems:    Constitutional: no fever, chills and night sweats.   Eyes: no eye drainage, itching or redness.  HEENT: no mouth sores, dysphagia or nose bleed.  Respiratory: no for shortness of breath, cough or production of sputum.  Cardiovascular: no chest pain, no palpitations, no orthopnea.  Gastrointestinal: no nausea, vomiting or diarrhea. No abdominal pain, hematemesis or rectal bleeding.  Genitourinary: no dysuria or polyuria.  Hematologic/lymphatic: no lymph node abnormalities, no easy bruising or easy bleeding.  Musculoskeletal: Right ankle pain.  Skin: No rash and no itching.  Neurological: no loss of consciousness, no seizure, no headache.  Behavioral/Psych: no depression or suicidal ideation.  Endocrine: no hot flashes.  Immunologic: negative.    ----------------------------------------------------------------------------------------------------------------------      Objective       Current Hospital Meds:  ethyl alcohol, 1 application, Nasal, BID  heparin (porcine), 5,000  Units, Subcutaneous, Q12H  nicotine, 1 patch, Transdermal, Q24H  sodium chloride, 10 mL, Intravenous, Q12H  vancomycin, 1,250 mg, Intravenous, Q8H      sodium chloride, 125 mL/hr, Last Rate: 125 mL/hr (09/06/22 4127)      ----------------------------------------------------------------------------------------------------------------------    Vital Signs:  Temp:  [97.9 °F (36.6 °C)-98.8 °F (37.1 °C)] 98.2 °F (36.8 °C)  Heart Rate:  [60-80] 60  Resp:  [16-20] 17  BP: (119-154)/(73-91) 143/87  No data found.  SpO2 Percentage    09/07/22 1900 09/08/22 0300 09/08/22 0630   SpO2: 98% 96% 98%     SpO2:  [96 %-98 %] 98 %  on  Flow (L/min):  [2] 2;   Device (Oxygen Therapy): room air    Body mass index is 25.17 kg/m².  Wt Readings from Last 3 Encounters:   09/08/22 93.8 kg (206 lb 12.8 oz)        Intake/Output Summary (Last 24 hours) at 9/8/2022 0901  Last data filed at 9/8/2022 0524  Gross per 24 hour   Intake 5690 ml   Output 6850 ml   Net -1160 ml     Diet Regular  ----------------------------------------------------------------------------------------------------------------------      Physical Exam:    Constitutional:  male is sitting up in bed on room air in no apparent distress.  Eating breakfast and appears very comfortable.  Poor hygiene.  HENT:  Head: Normocephalic and atraumatic.  Mouth:  Moist mucous membranes.    Eyes:  Conjunctivae and EOM are normal.  No scleral icterus.  Neck:  Neck supple.  No JVD present.    Cardiovascular:  Normal rate, regular rhythm and normal heart sounds with no murmur. No edema.  Pulmonary/Chest:  No respiratory distress, no wheezes, no crackles, with normal breath sounds and good air movement.  Abdominal:  Soft.  Bowel sounds are normal.  No distension and no tenderness.   Musculoskeletal:  No edema, no tenderness, and no deformity.  No swelling or redness of joints.  Neurological:  Alert and oriented to person, place, and time.  No facial droop.  No slurred speech.   Skin:   Skin is warm and dry.  No rash noted.  No pallor.  Right ankle in Ace dressing this morning.  Psychiatric:  Normal mood and affect.  Behavior is normal.        ----------------------------------------------------------------------------------------------------------------------            Results from last 7 days   Lab Units 09/08/22 0159 09/07/22 0424 09/06/22 0054 09/05/22  1538 09/05/22  0858 09/05/22  0341 09/04/22 0253 09/03/22  1942 09/03/22  1710   CRP mg/dL  --   --   --   --  10.65*  --  16.74*  --  23.79*   LACTATE mmol/L  --   --   --  1.2  --   --   --   --  2.0   WBC 10*3/mm3 10.68 12.44* 13.91*  --   --    < > 14.37*   < >  --    HEMOGLOBIN g/dL 11.9* 12.1* 12.5*  --   --    < > 13.5   < >  --    HEMATOCRIT % 36.5* 37.0* 38.0  --   --    < > 39.3   < >  --    MCV fL 94.3 95.1 93.6  --   --    < > 90.3   < >  --    MCHC g/dL 32.6 32.7 32.9  --   --    < > 34.4   < >  --    PLATELETS 10*3/mm3 385 383 325  --   --    < > 287   < >  --    INR   --   --   --   --   --   --   --   --  1.15*    < > = values in this interval not displayed.     Results from last 7 days   Lab Units 09/08/22  0159 09/07/22 0424 09/06/22 0054 09/05/22  0341 09/04/22  0253 09/03/22  2210   SODIUM mmol/L 134* 132* 132* 131*   < > 127*   POTASSIUM mmol/L 3.8 3.9 4.0 3.8   < > 3.3*   MAGNESIUM mg/dL  --   --  1.9  --   --  1.9   CHLORIDE mmol/L 106 104 104 99   < > 91*   CO2 mmol/L 19.7* 19.3* 19.6* 21.5*   < > 24.2   BUN mg/dL 10 12 10 12   < > 17   CREATININE mg/dL 0.63* 0.68* 0.64* 0.75*   < > 0.79   CALCIUM mg/dL 8.4* 8.2* 8.5* 9.0   < > 9.0   GLUCOSE mg/dL 113* 112* 177* 102*   < > 109*   ALBUMIN g/dL 1.90*  --  1.75* 1.85*   < >  --    BILIRUBIN mg/dL 0.4  --  0.5 1.0   < >  --    ALK PHOS U/L 82  --  187* 116   < >  --    AST (SGOT) U/L 42*  --  30 34   < >  --    ALT (SGPT) U/L 29  --  17 13   < >  --     < > = values in this interval not displayed.   Estimated Creatinine Clearance: 206.8 mL/min (A) (by C-G formula based  on SCr of 0.63 mg/dL (L)).  No results found for: AMMONIA    No results found for: HGBA1C, POCGLU  No results found for: HGBA1C  No results found for: TSH, FREET4    Blood Culture   Date Value Ref Range Status   09/04/2022 Staphylococcus aureus, MRSA (C)  Final     Comment:     Infectious disease consultation is highly recommended to rule out distant foci of infection.  Methicillin resistant Staphylococcus aureus, Patient may be an isolation risk.   09/04/2022 Staphylococcus aureus, MRSA (C)  Final     Comment:     Infectious disease consultation is highly recommended to rule out distant foci of infection.  Methicillin resistant Staphylococcus aureus, Patient may be an isolation risk.   09/03/2022 Staphylococcus aureus, MRSA (C)  Final     Comment:     Infectious disease consultation is highly recommended to rule out distant foci of infection.  Methicillin resistant Staphylococcus aureus, Patient may be an isolation risk.   09/03/2022 Staphylococcus aureus, MRSA (C)  Final     Comment:     Infectious disease consultation is highly recommended to rule out distant foci of infection.  Methicillin resistant Staphylococcus aureus, Patient may be an isolation risk.     No results found for: URINECX  Wound Culture   Date Value Ref Range Status   09/03/2022 Moderate growth (3+) Staphylococcus aureus, MRSA (A)  Final     Comment:     Methicillin resistant Staphylococcus aureus, Patient may be an isolation risk.     No results found for: STOOLCX  No results found for: RESPCX  Pain Management Panel     Pain Management Panel Latest Ref Rng & Units 9/3/2022    AMPHETAMINES SCREEN, URINE Negative Negative    BARBITURATES SCREEN Negative Negative    BENZODIAZEPINE SCREEN, URINE Negative Negative    BUPRENORPHINEUR Negative Negative    COCAINE SCREEN, URINE Negative Negative    METHADONE SCREEN, URINE Negative Negative    METHAMPHETAMINEUR Negative Negative             ----------------------------------------------------------------------------------------------------------------------  Imaging Results (Last 24 Hours)     ** No results found for the last 24 hours. **          ----------------------------------------------------------------------------------------------------------------------    Pertinent Infectious Disease Results    Blood cultures from 9/3/2022 and 9/4/2022 with 2 out of 2 sets positive for MRSA. COVID-19 and influenza PCR negative.  HIV 1 and 2 nonreactive.  MRSA PCR positive.  Arterial Doppler of the right lower extremity from 9/3/2022 reports no evidence of right lower extremity arterial occlusion, low resistance arterial flow throughout the right leg compatible with downstream vasodilation in the setting of infection/inflammation, potential head or generous fluids soft tissue collection image of the medial right ankle.  CT of the right lower extremity from 9/3/2022 reports extensive soft tissue swelling about the medial and lateral areas of confluent edema but no definitive abscess or definite enhancing fluid collection, small punctuate gas collection and medial and posterior soft tissues, chronic appearing deformity of the calcaneus compatible with depressed fracture of the posterior facet of the calcaneus and depression of the sustentaculum talus, fracture appears healed but with some degree of malalignment. MRI of the right ankle from 9/5/2022 reports extensive soft tissue swelling and edema about the lower leg ankle and foot with 2 large complex areas of increased attenuation within the subcutaneous tissues that may represent developing abscesses, superimposed generalized soft tissue swelling that may represent underlying cellulitis, minimal ankle and subtalar joint fluid but no evidence of destructive changes to strongly support septic arthritis, evaluation of the posterior subtalar joint is limited as there is superimposed changes from apparent  chronic intra-articular calcaneal fracture, mild plantar fasciitis.    Status post incision and drainage of the right subtalar joint and incision and drainage of the medial and lateral ankle calcaneal bone level on 9/5/2022.     The patient is sitting up in bed on room air in no apparent distress.  Continues to complain of right ankle pain, especially with dressing changes, but otherwise has no new complaints at this time.  Currently in Ace dressing this morning.  Nurse reports no issues overnight.  Leukocytosis is resolved.  Blood cultures on 9/6/2022 are so far showing no growth.  1 out of 1 blood culture on 9/5/2022 is so far showing no growth.  Wound cultures of the right ankle on 9/5/2022 are so far showing MRSA.    Assessment/Plan     MRSA bacteremia resolved  Right ankle septic arthritis status post washout  Right ankle abscess status post I&D  IV drug use    I saw and examined the patient myself this morning and plan of care was discussed with Brittany Arteaga PA-C.  Patient seems to be very stable from infectious disease standpoint with no apparent distress or evidence of sepsis overnight with no fever or diarrhea and his leukocytosis has resolved with WBC this morning at 10.68.    His exam is benign with lungs clear to auscultation without any crackles wheezing or rhonchi and his abdomen is benign and heart exam does not show any evidence of heart murmur.    Repeat blood cultures have been showing no growth from 9/5/2022 and patient is awaiting referral for [facility] as he is going to require 6 weeks of IV antibiotic therapy with the possibility of de-escalation to oral regimen if continues to show improvement.    On 9/6/2022 patient had a SUMMER and could not rule out a small vegetation in the right coronary cusp of the aortic valve and I did recommend to repeat SUMMER towards the end of his antibiotic therapy.        Code Status:   Code Status and Medical Interventions:   Ordered at: 09/03/22 4984     Level Of  Support Discussed With:    Patient     Code Status (Patient has no pulse and is not breathing):    CPR (Attempt to Resuscitate)     Medical Interventions (Patient has pulse or is breathing):    Full Support       Brittany Arteaga PA-C  09/08/22  09:01 EDT     Electronically signed by Brittany Arteaga PA-C, 09/08/22, 9:12 AM EDT.      Electronically signed by Nely Mesa MD, 09/08/22, 12:49 PM EDT.
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
## 2022-09-08 NOTE — PLAN OF CARE
Goal Outcome Evaluation:               Pt was resting in bed at time of assessment. Pain managed with prn medications. Ambulated with crutches to bathroom with staff. Wound care completed per order. No c/o at this time, will continue to monitor for changes.

## 2022-09-08 NOTE — DISCHARGE SUMMARY
[facility]OSPITAL[facility] DISCHARGE SUMMARY    Patient Identification:  Name:  Matt Blanton  Age:  41 y.o.  Sex:  male  :  1980  MRN:  [id number]  Visit Number:  [id number]    Date of Admission: 9/3/2022  Date of Discharge:  2022    PCP: Provider, No Known    DISCHARGE DIAGNOSIS  #Sepsis in setting of MRSA bacteremia  #Cellulitis of right ankles vs. Septic arthritis   #Hx of IV drug use   #Dental caries   #Hepatitis C positive antibody   #Hypokalemia    CONSULTS   Infectious disease   Podiatry   General surgery     PROCEDURES PERFORMED  I&D right ankle     SUMMER     PICC line placement     HOSPITAL COURSE  Patient is a 41 y.o. male presented on 9/3 to [facility] complaining of R foot/ankle swelling/redness/drainage.  Please see the admitting history and physical for further details.      Mr. Blanton is our 40 yo M with hx IVDU, tobacco use who presented with R foot/ankle infection. Blood cultures from  and  positive for MRSA in 2/ bottles. Wound culture from 9/3 and intraoperative culture from  also growing MRSA. Repeat blood cultures  NGTD. Podiatry took for the I&D of R ankle on . MRI did not reveal evidence of septic joint, just soft tisssue abscesses noted. TTE did not reveal endocarditis but patient high risk. SUMMER could not rule out endocarditis on right coronary cusp. Will need abx treatment for 6 weeks with vancomycin. If culture from  remains negative, stop date would be 10/20 unless deesclation to PO therapy appropriate. Continue vanc trough monitoring and at least weekly CBC/CMP. Continue dressing changes as provided by podiatry to nursing staff. Irrigation with saline, packed with 2 inch gauze soaked with betadine in 2 foot wounds, wrapped. ID recommended repeat SUMMER toward end of abx course. Recommend outpatient f/u for positive hepatitis C antibody.       VITAL SIGNS:  Temp:  [97.9 °F (36.6 °C)-98.8 °F (37.1 °C)] 98.2 °F (36.8 °C)  Heart Rate:   [60-80] 60  Resp:  [16-20] 17  BP: (119-154)/(73-91) 143/87  SpO2:  [96 %-98 %] 98 %  on   ;   Device (Oxygen Therapy): room air    Body mass index is 25.17 kg/m².  Wt Readings from Last 3 Encounters:   09/08/22 93.8 kg (206 lb 12.8 oz)       PHYSICAL EXAM:  Constitutional:  Well-developed and well-nourished.  No respiratory distress.      HENT:  Head:  Normocephalic and atraumatic.  Mouth:  Moist mucous membranes.    Eyes:  Conjunctivae and EOM are normal.  Pupils are equal, round, and reactive to light.  No scleral icterus.    Cardiovascular:  Normal rate, regular rhythm and normal heart sounds with no murmur.  Pulmonary/Chest:  No respiratory distress, no wheezes, no crackles, with normal breath sounds and good air movement.  Abdominal:  Soft.  Bowel sounds are normal.  No distension and no tenderness.   Musculoskeletal:  R foot bandaging in place without surrounding redness, warmth.   Neurological:  Alert and oriented to person, place, and time.  No gross neurological deficit.   Skin:  Skin is warm and dry. No rash noted. No pallor.   Peripheral vascular:  Strong pulses in all 4 extremities with no clubbing, no cyanosis, no edema.    DISCHARGE DISPOSITION   Stable    DISCHARGE MEDICATIONS:     Discharge Medications      Patient Not Prescribed Medications Upon Discharge           Additional Instructions for the Follow-ups that You Need to Schedule     CBC & Differential    Sep 03, 2022 (Approximate)      Manual Differential: No    Release to patient: Routine Release                TEST  RESULTS PENDING AT DISCHARGE  Pending Labs     Order Current Status    Blood Culture - Blood, Arm, Left Preliminary result    Blood Culture - Blood, Arm, Right Preliminary result    Blood Culture - Blood, Wrist, Right Preliminary result           CODE STATUS  Code Status and Medical Interventions:   Ordered at: 09/03/22 5060     Level Of Support Discussed With:    Patient     Code Status (Patient has no pulse and is not  breathing):    CPR (Attempt to Resuscitate)     Medical Interventions (Patient has pulse or is breathing):    Full Support       The ASCVD Risk score (Jose Amaritza SANTOS Jr., et al., 2013) failed to calculate for the following reasons:    Cannot find a previous HDL lab    Cannot find a previous total cholesterol lab     Meek García MD  [facility]  09/08/22  14:21 EDT    Please note that this discharge summary required more than 30 minutes to complete.

## 2022-09-08 NOTE — CASE MANAGEMENT/SOCIAL WORK
Discharge Planning Assessment  [facility]     Patient Name: Matt Blanton  MRN: [id number]  Today's Date: 9/8/2022    Admit Date: 9/3/2022     Discharge Plan     Row Name 09/08/22 1432       Plan    Plan [facility] per Kathrin states pt has been approved for admission and can be admitted today with a negative Covid test and discharge summary.  notified Dr. García.  to follow.    16:49: Pt is being discharged to [facility] today. Pt is agreeable to be discharged to [facility]. No other needs identified.              Continued Care and Services - Admitted Since 9/3/2022     Destination Coordination complete.    Service Provider Request Status Selected Services Address Phone Fax Patient Preferred    [facility] [address] [place] [address] [place] [address] [phone] --              Expected Discharge Date and Time     Expected Discharge Date Expected Discharge Time    Sep 10, 2022         KATERINA Gallardo

## 2022-09-08 NOTE — NURSING NOTE
Report called to [facility] patient stated he had called his mother to let her know he was being moved

## 2022-09-08 NOTE — PLAN OF CARE
Goal Outcome Evaluation:  Plan of Care Reviewed With: patient        Progress: improving  Outcome Evaluation: Pt presents w/ fair/good mobility limited by pain and precaution.  Will see pt 1-3 more sessions for gait/advance gait training.

## 2022-09-08 NOTE — PLAN OF CARE
Goal Outcome Evaluation:           Progress: improving  Outcome Evaluation: pt has rested in bed, had a midline placed in the left upper arm for continued antibiotic administration and tolerated well, wound care done per order. Has had pain managed prn with pain medications. No acute changes.

## 2022-09-08 NOTE — PAYOR COMM NOTE
"CONTACT:  Jenny Fountain RN    Utilization Management Dept.   [facility]  [address] [place] [address]    Phone:[phone]  Fax: [phone]    REQUEST FOR ADDITIONAL DAYS  REF # [id number]      Matt Blanton (41 y.o. Male)             Date of Birth   1980    Social Security Number       Address   [address] [facility] [facility] [address]    Home Phone   [phone]    MRN   [id number]       Taoism   [facility]    Marital Status   Single                            Admission Date   9/3/22    Admission Type   Emergency    Admitting Provider   Timbo Green MD    Attending Provider   Meek García MD    Department, Room/Bed   [address], [facility]       Discharge Date       Discharge Disposition       Discharge Destination                               Attending Provider: Meek García MD    Allergies: No Known Allergies    Isolation: None   Infection: MRSA No Isolation this Admit (09/04/22), MRSA (09/06/22)   Code Status: CPR   Advance Care Planning Activity    Ht: 193 cm (76\")   Wt: 93.8 kg (206 lb 12.8 oz)    Admission Cmt: None   Principal Problem: None                Active Insurance as of 9/3/2022     Primary Coverage     Payor Plan Insurance Group Employer/Plan Group    [employer] [place] [employer] [place]      Payor Plan Address Payor Plan Phone Number Payor Plan Fax Number Effective Dates    [address] [address] [phone]  1/8/2021 - None Entered    [facility] [address]       Subscriber Name Subscriber Birth Date Member ID       MATT BLANTON 1980 [id number]                 Emergency Contacts      (Rel.) Home Phone Work Phone Mobile Phone    SUDHEER BLANTON (Mother) [phone] -- --            Vital Signs (last day)     Date/Time Temp Temp src Pulse Resp BP Patient Position SpO2    09/08/22 0630 98.2 (36.8) Oral 60 17 143/87 Lying 98    09/08/22 0300 98.2 (36.8) Oral 80 16 119/74 Lying 96    09/07/22 1900 98.8 (37.1) Oral 73 16 131/73 Lying 98    " 09/07/22 1438 97.9 (36.6) Oral 71 20 154/91 Lying --    09/07/22 0600 98.1 (36.7) Oral 64 18 156/80 Lying 97    09/07/22 0300 97.9 (36.6) Oral 78 18 150/92 Lying --          Current Facility-Administered Medications   Medication Dose Route Frequency Provider Last Rate Last Admin   • ethyl alcohol 62 % 1 each  1 application Nasal BID Adebayo Rios MD   1 each at 09/08/22 0930   • heparin (porcine) 5000 UNIT/ML injection 5,000 Units  5,000 Units Subcutaneous Q12H Adebayo Rios MD   5,000 Units at 09/08/22 0930   • ibuprofen (ADVIL,MOTRIN) tablet 400 mg  400 mg Oral Q4H PRN Meek García MD   400 mg at 09/07/22 1831   • morphine injection 1 mg  1 mg Intravenous Q4H PRN Adebayo Rios MD   1 mg at 09/08/22 1017   • nicotine (NICODERM CQ) 14 MG/24HR patch 1 patch  1 patch Transdermal Q24H Adebayo Rios MD   1 patch at 09/08/22 0937   • potassium chloride (K-DUR,KLOR-CON) ER tablet 40 mEq  40 mEq Oral PRN Adebayo Rios MD       • potassium chloride (KLOR-CON) packet 40 mEq  40 mEq Oral PRN Adebayo Rios MD       • sodium chloride 0.9 % flush 10 mL  10 mL Intravenous PRN Adebayo Rios MD       • sodium chloride 0.9 % flush 10 mL  10 mL Intravenous Q12H Adebayo Rios MD   10 mL at 09/08/22 0929   • sodium chloride 0.9 % flush 10 mL  10 mL Intravenous PRN Adebayo Rios MD       • sodium chloride 0.9 % infusion  125 mL/hr Intravenous Continuous Adebayo Rios  mL/hr at 09/06/22 2247 125 mL/hr at 09/06/22 2247   • vancomycin 1250 mg/250 mL 0.9% NS IVPB ([facility])  1,250 mg Intravenous Q8H Adebayo Rios MD   1,250 mg at 09/08/22 0929     Lab Results (last 24 hours)     Procedure Component Value Units Date/Time    Wound Culture - Wound, Ankle, Right [[id number]]  (Abnormal) Collected: 09/05/22 1727    Specimen: Wound from Ankle, Right Updated: 09/08/22 0930     Wound Culture Scant growth (1+) Staphylococcus aureus, MRSA     Comment: Methicillin resistant Staphylococcus aureus, Patient may be an  isolation risk.        Gram Stain Many (4+) Red blood cells      Moderate (3+) WBCs seen      Few (2+) Gram positive cocci in pairs and clusters    Narrative:      Refer to previous wound culture collected on 9/5/2022 1727 for MICs    Wound Culture - Wound, Ankle, Right [[id number]]  (Abnormal)  (Susceptibility) Collected: 09/05/22 1727    Specimen: Wound from Ankle, Right Updated: 09/08/22 0930     Wound Culture Light growth (2+) Staphylococcus aureus, MRSA     Comment: Methicillin resistant Staphylococcus aureus, Patient may be an isolation risk.        Gram Stain Moderate (3+) Red blood cells      Many (4+) WBCs seen      Many (4+) Gram positive cocci in pairs, chains and clusters    Susceptibility      Staphylococcus aureus, MRSA      TENZIN      Clindamycin Resistant     Erythromycin Resistant     Inducible Clindamycin Resistance Negative      Oxacillin Resistant     Rifampin Susceptible      Tetracycline Susceptible      Trimethoprim + Sulfamethoxazole Susceptible      Vancomycin Susceptible                    Linear View                   Comprehensive Metabolic Panel [[id number]]  (Abnormal) Collected: 09/08/22 0159    Specimen: Blood Updated: 09/08/22 0249     Glucose 113 mg/dL      BUN 10 mg/dL      Creatinine 0.63 mg/dL      Sodium 134 mmol/L      Potassium 3.8 mmol/L      Chloride 106 mmol/L      CO2 19.7 mmol/L      Calcium 8.4 mg/dL      Total Protein 6.1 g/dL      Albumin 1.90 g/dL      ALT (SGPT) 29 U/L      AST (SGOT) 42 U/L      Alkaline Phosphatase 82 U/L      Total Bilirubin 0.4 mg/dL      Globulin 4.2 gm/dL      A/G Ratio 0.5 g/dL      BUN/Creatinine Ratio 15.9     Anion Gap 8.3 mmol/L      eGFR 123.3 mL/min/1.73      Comment: National Kidney Foundation and American Society of Nephrology (ASN) Task Force recommended calculation based on the Chronic Kidney Disease Epidemiology Collaboration (CKD-EPI) equation refit without adjustment for race.       Narrative:      GFR Normal >60  Chronic Kidney  Disease <60  Kidney Failure <15      CBC & Differential [[id number]]  (Abnormal) Collected: 09/08/22 0159    Specimen: Blood Updated: 09/08/22 0228    Narrative:      The following orders were created for panel order CBC & Differential.  Procedure                               Abnormality         Status                     ---------                               -----------         ------                     CBC Auto Differential[[id number]]        Abnormal            Final result                 Please view results for these tests on the individual orders.    CBC Auto Differential [[id number]]  (Abnormal) Collected: 09/08/22 0159    Specimen: Blood Updated: 09/08/22 0228     WBC 10.68 10*3/mm3      RBC 3.87 10*6/mm3      Hemoglobin 11.9 g/dL      Hematocrit 36.5 %      MCV 94.3 fL      MCH 30.7 pg      MCHC 32.6 g/dL      RDW 13.8 %      RDW-SD 47.9 fl      MPV 9.4 fL      Platelets 385 10*3/mm3      Neutrophil % 61.4 %      Lymphocyte % 24.6 %      Monocyte % 7.5 %      Eosinophil % 1.5 %      Basophil % 0.6 %      Immature Grans % 4.4 %      Neutrophils, Absolute 6.56 10*3/mm3      Lymphocytes, Absolute 2.63 10*3/mm3      Monocytes, Absolute 0.80 10*3/mm3      Eosinophils, Absolute 0.16 10*3/mm3      Basophils, Absolute 0.06 10*3/mm3      Immature Grans, Absolute 0.47 10*3/mm3      nRBC 0.0 /100 WBC     Blood Culture - Blood, Arm, Right [[id number]]  (Normal) Collected: 09/06/22 1719    Specimen: Blood from Arm, Right Updated: 09/07/22 1818     Blood Culture No growth at 24 hours    Blood Culture - Blood, Wrist, Right [[id number]]  (Normal) Collected: 09/06/22 1719    Specimen: Blood from Wrist, Right Updated: 09/07/22 1818     Blood Culture No growth at 24 hours    Blood Culture - Blood, Arm, Left [[id number]]  (Normal) Collected: 09/05/22 1538    Specimen: Blood from Arm, Left Updated: 09/07/22 1616     Blood Culture No growth at 2 days        Imaging Results (Most Recent)     Procedure Component Value Units  Date/Time    MRI Ankle Right Without Contrast [[id number]] Collected: 09/05/22 1505     Updated: 09/05/22 1507    Narrative:      MRI Ankle RT WO    INDICATION:    Reported substance abuse. Clinical concern for abscess and cellulitis right lower extremity.    TECHNIQUE:   MRI of the right ankle without IV contrast.    COMPARISON:    CT right lower extremity 9/3/2022    FINDINGS:  Study limited due to lack of contrast and limited sequences. Extensive soft tissue swelling about the lower leg ankle and foot with at least 2 large complex confluent areas of increased T2 signal. In the posterior ankle medial to the Achilles tendon  there is a 3.9 x 2 x 7 cm collection which could represent a developing abscess. Along the lateral ankle anterior lateral to the fibula and peroneal tendons there is a 4.5 x 2.5 x 7.6 cm complex collection which could also represent a developing abscess.    Small ankle and subtalar joint effusions could be reactive but underlying infection not entirely excluded. No evidence of ankle joint destruction. Deformity of the posterior facet of the calcaneus compatible with likely old calcaneal fracture. No  destructive changes within the subtalar joint to suggest septic arthritis.    Moderate amount of tendinosis Achilles tendon particularly at the distal tendon insertion but no tear. Peroneus longus tendinosis without tear. Medial flexor and visualized anterior extensor tendons appear normal. Nonvisualization of the anterior  talofibular ligament likely the sequela of chronic tear.    Mild thickening and edema within the central medial cord of plantar fascia may reflect plantar fasciitis. Intrinsic foot musculature unremarkable.      Impression:      Extensive soft tissue swelling and edema about the lower leg ankle and foot with 2 large complex areas of increased attenuation within the subcutaneous tissues as detailed above which in the clinical setting may represent developing  abscesses.  Superimposed generalized soft tissue swelling likely reflecting underlying cellulitis.    Minimal ankle and subtalar joint fluid but no evidence of destructive changes to strongly support septic arthritis. Evaluation of the posterior subtalar joint limited as there is a superimposed changes from apparent chronic intra-articular calcaneal  fracture. Recent CT suggested healing with some degree of malunion.    Moderate Achilles tendinosis without tear. Mild plantar fasciitis.    Findings called and discussed with the patient's nurse on 3 N. at the time of this dictation.    Signer Name: KACY Knapp MD   Signed: 9/5/2022 3:05 PM   Workstation Name: [id number]    [facility]    CT Lower Extremity Right With Contrast [[id number]] Collected: 09/03/22 2015     Updated: 09/03/22 2017    Narrative:      CT LE RT W    INDICATION:    Evaluate for percutaneous abscess right lower extremity. Patient with cellulitis.    TECHNIQUE:   CT of the right lower leg knee to ankle with IV contrast. Coronal and sagittal reconstructions were obtained.  Radiation dose reduction techniques included automated exposure control or exposure modulation based on body size. Count of known CT and  cardiac nuc med studies performed in previous 12 months: 0.      COMPARISON:    None available.    FINDINGS:  Deformity of the calcaneus compatible with old depressed intra-articular fracture of the posterior calcaneus and facet. Depression of the sustentaculum talus with malalignment at the subtalar joint. Ankle joint is unremarkable. No joint effusion. The  knee joint appears normal.    Extensive soft tissue swelling and edema about the ankle but no definitive drainable fluid collection or abscess. Small amount of punctate gas in the medial ankle soft tissues as well as a small focus of gas posterior to the talus. Correlate for any  attempt at aspiration. Ankle tendons unremarkable.    Lower leg musculature  appears normal. Visualized neurovascular structures unremarkable.      Impression:      Extensive soft tissue swelling about the medial and lateral ankle with areas of confluent edema but no definitive abscess or definite enhancing fluid collection. Small punctate gas collection medial and posterior soft tissues. Correlate for any attempt  at aspiration.    Chronic appearing deformity of the calcaneus compatible with depressed fracture of the posterior facet of the calcaneus and depression of the sustentaculum talus. Fracture appears healed but with some degree of malalignment and incongruity at the  posterior subtalar joint.    Signer Name: KACY Knapp MD   Signed: 9/3/2022 8:15 PM   Workstation Name: [email or web address]    [facility] Arterial Doppler Lower Extremity Right [[id number]] Collected: 09/03/22 1849     Updated: 09/03/22 1851    Narrative:        RIGHT LOWER EXTREMITY ARTERIAL DOPPLER IMAGING, 9/3/2022    HISTORY:    41-year-old male with history of IVDU presenting to the ED with right lower leg swelling and discoloration.      TECHNIQUE:  Right lower extremity arterial vascular ultrasound imaging was performed using grey scale, spectral Doppler and color flow Doppler. Assessed segments defined per protocol.    FINDINGS:  The external iliac, common femoral, deep femoral and superficial femoral arteries appear widely patent. Popliteal artery and imaged posterior tibial artery are also widely patent.    Abnormal low resistance Doppler waveforms throughout the right leg with followed flow throughout diastole, likely reflecting downstream vasodilatation in the setting of infection/inflammation.    Heterogeneous soft tissue swelling and or complex fluid collection at the medial aspect of the ankle, not measured by the ultrasound technologist. A CT examination of the lower extremity has reportedly been ordered. CT with contrast is recommended to  assess for abscess.       Impression:      1.  No evidence of right lower extremity arterial occlusion.  2.  Low resistance arterial flow throughout the right leg compatible with downstream vasodilatation in the clinical setting of infection/inflammation.  3.  Potential heterogeneous fluid soft tissue collection imaged at the medial right ankle. Recommend CT imaging of the lower leg with IV contrast to assess for potential abscess.      Signer Name: Jason Schwarz MD   Signed: 9/3/2022 6:49 PM   Workstation [facility]: [email or web address]    [facility]        ECG/EMG Results (most recent)     Procedure Component Value Units Date/Time    ECG 12 Lead [[id number]] Collected: 09/03/22 1732     Updated: 09/04/22 2000     QT Interval 378 ms      QTC Interval 487 ms     Narrative:      Test Reason : tachycardia  Blood Pressure :   */*   mmHG  Vent. Rate : 100 BPM     Atrial Rate : 100 BPM     P-R Int : 148 ms          QRS Dur :  96 ms      QT Int : 378 ms       P-R-T Axes :  76  70  66 degrees     QTc Int : 487 ms    Normal sinus rhythm  Biatrial enlargement  Prolonged QT  Abnormal ECG  No previous ECGs available  Confirmed by Christian Redding (2019) on 9/4/2022 7:59:53 PM    Referred By:            Confirmed By: Christian Redding    Adult Transthoracic Echo Complete w/ Color, Spectral and Contrast if Necessary Per Protocol [[id number]] Resulted: 09/04/22 2104     Updated: 09/04/22 2107     Target HR (85%) 153 bpm      Max. Pred. HR (100%) 180 bpm      LA ESV Index (BP) 13.0 ml/m2      Avg E/e' ratio 5.01     ACS 2.40 cm      Ao root diam 3.2 cm      Ao pk fredi 109.0 cm/sec      Ao V2 VTI 22.1 cm      EDV(cubed) 161.0 ml      EDV(MOD-sp4) 81.3 ml      EF(MOD-sp4) 49.9 %      ESV(cubed) 56.2 ml      ESV(MOD-sp4) 40.7 ml      IVS/LVPW 0.72 cm      Lat Peak E' Fredi 15.0 cm/sec      LV mass(C)d 161.6 grams      LVPWd 0.95 cm      Med Peak E' Fredi 10.9 cm/sec      PA acc time 0.10 sec      PA pr(Accel) 34.4 mmHg      RAP systole 10.0 mmHg       RVSP(TR) 32.1 mmHg      SV(MOD-sp4) 40.6 ml      TR max PG 22.1 mmHg      Ao max PG 4.8 mmHg      Ao mean PG 2.00 mmHg      FS 29.6 %      IVSd 0.68 cm      LA dimension (2D)  2.9 cm      LVIDd 5.4 cm      LVIDs 3.8 cm      LVOT area 4.2 cm2      LVOT diam 2.30 cm      MV E/A 0.94     MV A max john 69.2 cm/sec      MV E max john 64.9 cm/sec      TR max john 235.0 cm/sec      TAPSE (>1.6) 2.7 cm      Echo EF Estimated 65 %     Narrative:      · Estimated left ventricular EF = 65% Left ventricular ejection fraction   appears to be 61 - 65%. Left ventricular systolic function is normal.  · Left ventricular diastolic function was normal.  · Estimated right ventricular systolic pressure from tricuspid   regurgitation is normal (<35 mmHg).  · No significant valvular abnormality. No definite vegetation or mass seen  · No pericardial effusion  · No prev echo       Adult Transesophageal Echo (SUMMER) W/ Cont if Necessary Per Protocol [[id number]] Resulted: 22 1445     Updated: 22 1455     Target HR (85%) 153 bpm      Max. Pred. HR (100%) 180 bpm     Narrative:      · Left ventricular ejection fraction appears to be 61 - 65%. Left   ventricular systolic function is normal.  · Left ventricular diastolic function was not assessed.  · Saline test results are negative.  · Could not rule out small vegetation on the right coronary cusp of the   aortic valve.              Physician Progress Notes (most recent note)      Nely Mesa MD at 22 0901                     PROGRESS NOTE         Patient Identification:  Name:  Matt Blanton  Age:  41 y.o.  Sex:  male  :  1980  MRN:  [id number]  Visit Number:  [id number]  Primary Care Provider:  Provider, No Known         LOS: 5 days       ----------------------------------------------------------------------------------------------------------------------  Subjective       Chief Complaints:    Abscess        Interval History:      The patient is sitting up in bed  on room air in no apparent distress.  Continues to complain of right ankle pain, especially with dressing changes, but otherwise has no new complaints at this time.  Currently in Ace dressing this morning.  Nurse reports no issues overnight.  Leukocytosis is resolved.  Blood cultures on 9/6/2022 are so far showing no growth.  1 out of 1 blood culture on 9/5/2022 is so far showing no growth.  Wound cultures of the right ankle on 9/5/2022 are so far showing MRSA.    Review of Systems:    Constitutional: no fever, chills and night sweats.   Eyes: no eye drainage, itching or redness.  HEENT: no mouth sores, dysphagia or nose bleed.  Respiratory: no for shortness of breath, cough or production of sputum.  Cardiovascular: no chest pain, no palpitations, no orthopnea.  Gastrointestinal: no nausea, vomiting or diarrhea. No abdominal pain, hematemesis or rectal bleeding.  Genitourinary: no dysuria or polyuria.  Hematologic/lymphatic: no lymph node abnormalities, no easy bruising or easy bleeding.  Musculoskeletal: Right ankle pain.  Skin: No rash and no itching.  Neurological: no loss of consciousness, no seizure, no headache.  Behavioral/Psych: no depression or suicidal ideation.  Endocrine: no hot flashes.  Immunologic: negative.    ----------------------------------------------------------------------------------------------------------------------      Objective       Current [facility] Meds:  ethyl alcohol, 1 application, Nasal, BID  heparin (porcine), 5,000 Units, Subcutaneous, Q12H  nicotine, 1 patch, Transdermal, Q24H  sodium chloride, 10 mL, Intravenous, Q12H  vancomycin, 1,250 mg, Intravenous, Q8H      sodium chloride, 125 mL/hr, Last Rate: 125 mL/hr (09/06/22 8351)      ----------------------------------------------------------------------------------------------------------------------    Vital Signs:  Temp:  [97.9 °F (36.6 °C)-98.8 °F (37.1 °C)] 98.2 °F (36.8 °C)  Heart Rate:  [60-80] 60  Resp:  [16-20] 17  BP:  (119-154)/(73-91) 143/87  No data found.  SpO2 Percentage    09/07/22 1900 09/08/22 0300 09/08/22 0630   SpO2: 98% 96% 98%     SpO2:  [96 %-98 %] 98 %  on  Flow (L/min):  [2] 2;   Device (Oxygen Therapy): room air    Body mass index is 25.17 kg/m².  Wt Readings from Last 3 Encounters:   09/08/22 93.8 kg (206 lb 12.8 oz)        Intake/Output Summary (Last 24 hours) at 9/8/2022 0901  Last data filed at 9/8/2022 0524  Gross per 24 hour   Intake 5690 ml   Output 6850 ml   Net -1160 ml     Diet Regular  ----------------------------------------------------------------------------------------------------------------------      Physical Exam:    Constitutional:  male is sitting up in bed on room air in no apparent distress.  Eating breakfast and appears very comfortable.  Poor hygiene.  HENT:  Head: Normocephalic and atraumatic.  Mouth:  Moist mucous membranes.    Eyes:  Conjunctivae and EOM are normal.  No scleral icterus.  Neck:  Neck supple.  No JVD present.    Cardiovascular:  Normal rate, regular rhythm and normal heart sounds with no murmur. No edema.  Pulmonary/Chest:  No respiratory distress, no wheezes, no crackles, with normal breath sounds and good air movement.  Abdominal:  Soft.  Bowel sounds are normal.  No distension and no tenderness.   Musculoskeletal:  No edema, no tenderness, and no deformity.  No swelling or redness of joints.  Neurological:  Alert and oriented to person, place, and time.  No facial droop.  No slurred speech.   Skin:  Skin is warm and dry.  No rash noted.  No pallor.  Right ankle in Ace dressing this morning.  Psychiatric:  Normal mood and affect.  Behavior is normal.        ----------------------------------------------------------------------------------------------------------------------            Results from last 7 days   Lab Units 09/08/22  0159 09/07/22  0424 09/06/22  0054 09/05/22  1538 09/05/22  0858 09/05/22  0341 09/04/22  0253 09/03/22  1942 09/03/22  1710   CRP  mg/dL  --   --   --   --  10.65*  --  16.74*  --  23.79*   LACTATE mmol/L  --   --   --  1.2  --   --   --   --  2.0   WBC 10*3/mm3 10.68 12.44* 13.91*  --   --    < > 14.37*   < >  --    HEMOGLOBIN g/dL 11.9* 12.1* 12.5*  --   --    < > 13.5   < >  --    HEMATOCRIT % 36.5* 37.0* 38.0  --   --    < > 39.3   < >  --    MCV fL 94.3 95.1 93.6  --   --    < > 90.3   < >  --    MCHC g/dL 32.6 32.7 32.9  --   --    < > 34.4   < >  --    PLATELETS 10*3/mm3 385 383 325  --   --    < > 287   < >  --    INR   --   --   --   --   --   --   --   --  1.15*    < > = values in this interval not displayed.     Results from last 7 days   Lab Units 09/08/22  0159 09/07/22  0424 09/06/22  0054 09/05/22  0341 09/04/22  0253 09/03/22  2210   SODIUM mmol/L 134* 132* 132* 131*   < > 127*   POTASSIUM mmol/L 3.8 3.9 4.0 3.8   < > 3.3*   MAGNESIUM mg/dL  --   --  1.9  --   --  1.9   CHLORIDE mmol/L 106 104 104 99   < > 91*   CO2 mmol/L 19.7* 19.3* 19.6* 21.5*   < > 24.2   BUN mg/dL 10 12 10 12   < > 17   CREATININE mg/dL 0.63* 0.68* 0.64* 0.75*   < > 0.79   CALCIUM mg/dL 8.4* 8.2* 8.5* 9.0   < > 9.0   GLUCOSE mg/dL 113* 112* 177* 102*   < > 109*   ALBUMIN g/dL 1.90*  --  1.75* 1.85*   < >  --    BILIRUBIN mg/dL 0.4  --  0.5 1.0   < >  --    ALK PHOS U/L 82  --  187* 116   < >  --    AST (SGOT) U/L 42*  --  30 34   < >  --    ALT (SGPT) U/L 29  --  17 13   < >  --     < > = values in this interval not displayed.   Estimated Creatinine Clearance: 206.8 mL/min (A) (by C-G formula based on SCr of 0.63 mg/dL (L)).  No results found for: AMMONIA    No results found for: HGBA1C, POCGLU  No results found for: HGBA1C  No results found for: TSH, FREET4    Blood Culture   Date Value Ref Range Status   09/04/2022 Staphylococcus aureus, MRSA (C)  Final     Comment:     Infectious disease consultation is highly recommended to rule out distant foci of infection.  Methicillin resistant Staphylococcus aureus, Patient may be an isolation risk.   09/04/2022  Staphylococcus aureus, MRSA (C)  Final     Comment:     Infectious disease consultation is highly recommended to rule out distant foci of infection.  Methicillin resistant Staphylococcus aureus, Patient may be an isolation risk.   09/03/2022 Staphylococcus aureus, MRSA (C)  Final     Comment:     Infectious disease consultation is highly recommended to rule out distant foci of infection.  Methicillin resistant Staphylococcus aureus, Patient may be an isolation risk.   09/03/2022 Staphylococcus aureus, MRSA (C)  Final     Comment:     Infectious disease consultation is highly recommended to rule out distant foci of infection.  Methicillin resistant Staphylococcus aureus, Patient may be an isolation risk.     No results found for: URINECX  Wound Culture   Date Value Ref Range Status   09/03/2022 Moderate growth (3+)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

## 2022-09-09 ENCOUNTER — OUTSIDE FACILITY SERVICE (OUTPATIENT)
Dept: INFECTIOUS DISEASES | Facility: CLINIC | Age: 42
End: 2022-09-09

## 2022-09-09 ENCOUNTER — APPOINTMENT (OUTPATIENT)
Dept: GENERAL RADIOLOGY | Facility: HOSPITAL | Age: 42
End: 2022-09-09

## 2022-09-09 LAB
ALBUMIN SERPL-MCNC: 2.18 G/DL (ref 3.5–5.2)
ALBUMIN/GLOB SERPL: 0.5 G/DL
ALP SERPL-CCNC: 88 U/L (ref 39–117)
ALT SERPL W P-5'-P-CCNC: 33 U/L (ref 1–41)
ANION GAP SERPL CALCULATED.3IONS-SCNC: 9.8 MMOL/L (ref 5–15)
AST SERPL-CCNC: 36 U/L (ref 1–40)
BASOPHILS # BLD AUTO: 0.07 10*3/MM3 (ref 0–0.2)
BASOPHILS NFR BLD AUTO: 0.6 % (ref 0–1.5)
BILIRUB SERPL-MCNC: 0.4 MG/DL (ref 0–1.2)
BUN SERPL-MCNC: 11 MG/DL (ref 6–20)
BUN/CREAT SERPL: 18 (ref 7–25)
CALCIUM SPEC-SCNC: 8.8 MG/DL (ref 8.6–10.5)
CHLORIDE SERPL-SCNC: 100 MMOL/L (ref 98–107)
CO2 SERPL-SCNC: 20.2 MMOL/L (ref 22–29)
CREAT SERPL-MCNC: 0.61 MG/DL (ref 0.76–1.27)
CRP SERPL-MCNC: 2.1 MG/DL (ref 0–0.5)
DEPRECATED RDW RBC AUTO: 47.7 FL (ref 37–54)
EGFRCR SERPLBLD CKD-EPI 2021: 124.5 ML/MIN/1.73
EOSINOPHIL # BLD AUTO: 0.19 10*3/MM3 (ref 0–0.4)
EOSINOPHIL NFR BLD AUTO: 1.6 % (ref 0.3–6.2)
ERYTHROCYTE [DISTWIDTH] IN BLOOD BY AUTOMATED COUNT: 13.7 % (ref 12.3–15.4)
GLOBULIN UR ELPH-MCNC: 4.6 GM/DL
GLUCOSE SERPL-MCNC: 105 MG/DL (ref 65–99)
HCT VFR BLD AUTO: 39 % (ref 37.5–51)
HGB BLD-MCNC: 12.7 G/DL (ref 13–17.7)
IMM GRANULOCYTES # BLD AUTO: 0.42 10*3/MM3 (ref 0–0.05)
IMM GRANULOCYTES NFR BLD AUTO: 3.5 % (ref 0–0.5)
LYMPHOCYTES # BLD AUTO: 2.99 10*3/MM3 (ref 0.7–3.1)
LYMPHOCYTES NFR BLD AUTO: 25.1 % (ref 19.6–45.3)
MCH RBC QN AUTO: 30.8 PG (ref 26.6–33)
MCHC RBC AUTO-ENTMCNC: 32.6 G/DL (ref 31.5–35.7)
MCV RBC AUTO: 94.7 FL (ref 79–97)
MONOCYTES # BLD AUTO: 0.86 10*3/MM3 (ref 0.1–0.9)
MONOCYTES NFR BLD AUTO: 7.2 % (ref 5–12)
NEUTROPHILS NFR BLD AUTO: 62 % (ref 42.7–76)
NEUTROPHILS NFR BLD AUTO: 7.4 10*3/MM3 (ref 1.7–7)
NRBC BLD AUTO-RTO: 0 /100 WBC (ref 0–0.2)
PLATELET # BLD AUTO: 399 10*3/MM3 (ref 140–450)
PMV BLD AUTO: 9 FL (ref 6–12)
POTASSIUM SERPL-SCNC: 3.8 MMOL/L (ref 3.5–5.2)
PREALB SERPL-MCNC: 10.4 MG/DL (ref 20–40)
PROT SERPL-MCNC: 6.8 G/DL (ref 6–8.5)
RBC # BLD AUTO: 4.12 10*6/MM3 (ref 4.14–5.8)
SODIUM SERPL-SCNC: 130 MMOL/L (ref 136–145)
WBC NRBC COR # BLD: 11.93 10*3/MM3 (ref 3.4–10.8)

## 2022-09-09 PROCEDURE — 71045 X-RAY EXAM CHEST 1 VIEW: CPT

## 2022-09-09 PROCEDURE — 87147 CULTURE TYPE IMMUNOLOGIC: CPT | Performed by: INTERNAL MEDICINE

## 2022-09-09 PROCEDURE — 99253 IP/OBS CNSLTJ NEW/EST LOW 45: CPT | Performed by: INTERNAL MEDICINE

## 2022-09-09 PROCEDURE — 87070 CULTURE OTHR SPECIMN AEROBIC: CPT | Performed by: INTERNAL MEDICINE

## 2022-09-09 PROCEDURE — 97161 PT EVAL LOW COMPLEX 20 MIN: CPT

## 2022-09-09 PROCEDURE — 71045 X-RAY EXAM CHEST 1 VIEW: CPT | Performed by: RADIOLOGY

## 2022-09-09 PROCEDURE — 86140 C-REACTIVE PROTEIN: CPT | Performed by: INTERNAL MEDICINE

## 2022-09-09 PROCEDURE — 87186 SC STD MICRODIL/AGAR DIL: CPT | Performed by: INTERNAL MEDICINE

## 2022-09-09 PROCEDURE — 87205 SMEAR GRAM STAIN: CPT | Performed by: INTERNAL MEDICINE

## 2022-09-09 PROCEDURE — 85025 COMPLETE CBC W/AUTO DIFF WBC: CPT | Performed by: INTERNAL MEDICINE

## 2022-09-09 PROCEDURE — 80053 COMPREHEN METABOLIC PANEL: CPT | Performed by: INTERNAL MEDICINE

## 2022-09-09 NOTE — PAYOR COMM NOTE
"CONTACT:   Jenny Fountain RN  Phone: [phone]  Fax: [phone]    DISCHARGE NOTIFICATION    DISCHARGE TO: [facility]    REF # [id number]  DC DATE: 2022    IF YOU NEED ANYTHING FURTHER PLEASE LET ME KNOW.   THANKS     Matt Blanton (41 y.o. Male)             Date of Birth   1980    Social Security Number       Address   [address] [facility] [place] [id number]    Home Phone   [phone]    MRN   [id number]       Buddhism   [facility]    Marital Status   Single                            Admission Date   9/3/22    Admission Type   Emergency    Admitting Provider   Timbo Green MD    Attending Provider       Department, Room/Bed   [address], 3350/1S       Discharge Date   2022    Discharge Disposition   Long Term Care (DC - External)    Discharge Destination                               Attending Provider: (none)   Allergies: No Known Allergies    Isolation: None   Infection: MRSA No Isolation this Admit (22), MRSA (22)   Code Status: CPR   Advance Care Planning Activity    Ht: 193 cm (76\")   Wt: 93.8 kg (206 lb 12.8 oz)    Admission Cmt: None   Principal Problem: None                Active Insurance as of 9/3/2022     Primary Coverage     Payor Plan Insurance Group Employer/Plan Group    [employer] [place] [employer]      Payor Plan Address Payor Plan Phone Number Payor Plan Fax Number Effective Dates    [address] [address] [phone]  2021 - None Entered    [facility] [id number]       Subscriber Name Subscriber Birth Date Member ID       MATT BLANTON 1980 [id number]                 Emergency Contacts      (Rel.) Home Phone Work Phone Mobile Phone    SUDHEER BLANTON (Mother) [phone] -- --               Discharge Summary      Meek García MD at 22 1421              [facility]    Patient Identification:  Name:  Matt Blanton  Age:  41 y.o.  Sex:  male  :  1980  MRN:  " [id number]  Visit Number:  [id number]    Date of Admission: 9/3/2022  Date of Discharge:  9/8/2022    PCP: Provider, No Known    DISCHARGE DIAGNOSIS  #Sepsis in setting of MRSA bacteremia  #Cellulitis of right ankles vs. Septic arthritis   #Hx of IV drug use   #Dental caries   #Hepatitis C positive antibody   #Hypokalemia    CONSULTS   Infectious disease   Podiatry   General surgery     PROCEDURES PERFORMED  I&D right ankle 9/5    SUMMER 9/6    PICC line placement 9/8    HOSPITAL COURSE  Patient is a 41 y.o. male [facility] complaining of R foot/ankle swelling/redness/drainage.  Please see the admitting history and physical for further details.      Mr. Blanton is our 40 yo M with hx IVDU, tobacco use who presented with R foot/ankle infection. Blood cultures from 9/2 and 9/4 positive for MRSA in 2/2 bottles. Wound culture from 9/3 and intraoperative culture from 9/5 also growing MRSA. Repeat blood cultures 9/6 NGTD. Podiatry took for the I&D of R ankle on 9/5. MRI did not reveal evidence of septic joint, just soft tisssue abscesses noted. TTE did not reveal endocarditis but patient high risk. SUMMER could not rule out endocarditis on right coronary cusp. Will need abx treatment for 6 weeks with vancomycin. If culture from 9/6 remains negative, stop date would be 10/20 unless deesclation to PO therapy appropriate. Continue vanc trough monitoring and at least weekly CBC/CMP. Continue dressing changes as provided by podiatry to nursing staff. Irrigation with saline, packed with 2 inch gauze soaked with betadine in 2 foot wounds, wrapped. ID recommended repeat SUMMER toward end of abx course. Recommend outpatient f/u for positive hepatitis C antibody.       VITAL SIGNS:  Temp:  [97.9 °F (36.6 °C)-98.8 °F (37.1 °C)] 98.2 °F (36.8 °C)  Heart Rate:  [60-80] 60  Resp:  [16-20] 17  BP: (119-154)/(73-91) 143/87  SpO2:  [96 %-98 %] 98 %  on   ;   Device (Oxygen Therapy): room air    Body mass index is  25.17 kg/m².  Wt Readings from Last 3 Encounters:   09/08/22 93.8 kg (206 lb 12.8 oz)       PHYSICAL EXAM:  Constitutional:  Well-developed and well-nourished.  No respiratory distress.      HENT:  Head:  Normocephalic and atraumatic.  Mouth:  Moist mucous membranes.    Eyes:  Conjunctivae and EOM are normal.  Pupils are equal, round, and reactive to light.  No scleral icterus.    Cardiovascular:  Normal rate, regular rhythm and normal heart sounds with no murmur.  Pulmonary/Chest:  No respiratory distress, no wheezes, no crackles, with normal breath sounds and good air movement.  Abdominal:  Soft.  Bowel sounds are normal.  No distension and no tenderness.   Musculoskeletal:  R foot bandaging in place without surrounding redness, warmth.   Neurological:  Alert and oriented to person, place, and time.  No gross neurological deficit.   Skin:  Skin is warm and dry. No rash noted. No pallor.   Peripheral vascular:  Strong pulses in all 4 extremities with no clubbing, no cyanosis, no edema.    DISCHARGE DISPOSITION   Stable    DISCHARGE MEDICATIONS:     Discharge Medications      Patient Not Prescribed Medications Upon Discharge           Additional Instructions for the Follow-ups that You Need to Schedule     CBC & Differential    Sep 03, 2022 (Approximate)      Manual Differential: No    Release to patient: Routine Release                TEST  RESULTS PENDING AT DISCHARGE  Pending Labs     Order Current Status    Blood Culture - Blood, Arm, Left Preliminary result    Blood Culture - Blood, Arm, Right Preliminary result    Blood Culture - Blood, Wrist, Right Preliminary result           CODE STATUS  Code Status and Medical Interventions:   Ordered at: 09/03/22 [id number]     Level Of Support Discussed With:    Patient     Code Status (Patient has no pulse and is not breathing):    CPR (Attempt to Resuscitate)     Medical Interventions (Patient has pulse or is breathing):    Full Support       The ASCVD Risk score (Jose A DC  , et al., 2013) failed to calculate for the following reasons:    Cannot find a previous HDL lab    Cannot find a previous total cholesterol lab     Meek García MD  [facility]  09/08/22  14:21 EDT    Please note that this discharge summary required more than 30 minutes to complete.      Electronically signed by Meek García MD at 09/08/22 [id number]

## 2022-09-10 ENCOUNTER — OUTSIDE FACILITY SERVICE (OUTPATIENT)
Dept: INFECTIOUS DISEASES | Facility: CLINIC | Age: 42
End: 2022-09-10

## 2022-09-10 LAB
ALBUMIN SERPL-MCNC: 2.58 G/DL (ref 3.5–5.2)
ALBUMIN/GLOB SERPL: 0.5 G/DL
ALP SERPL-CCNC: 90 U/L (ref 39–117)
ALT SERPL W P-5'-P-CCNC: 35 U/L (ref 1–41)
ANION GAP SERPL CALCULATED.3IONS-SCNC: 10.2 MMOL/L (ref 5–15)
AST SERPL-CCNC: 36 U/L (ref 1–40)
BACTERIA SPEC AEROBE CULT: NORMAL
BASOPHILS # BLD AUTO: 0.11 10*3/MM3 (ref 0–0.2)
BASOPHILS NFR BLD AUTO: 0.7 % (ref 0–1.5)
BILIRUB SERPL-MCNC: 0.4 MG/DL (ref 0–1.2)
BUN SERPL-MCNC: 16 MG/DL (ref 6–20)
BUN/CREAT SERPL: 21.9 (ref 7–25)
CALCIUM SPEC-SCNC: 9.1 MG/DL (ref 8.6–10.5)
CHLORIDE SERPL-SCNC: 102 MMOL/L (ref 98–107)
CO2 SERPL-SCNC: 21.8 MMOL/L (ref 22–29)
CREAT SERPL-MCNC: 0.73 MG/DL (ref 0.76–1.27)
CRP SERPL-MCNC: 1.56 MG/DL (ref 0–0.5)
DEPRECATED RDW RBC AUTO: 46.7 FL (ref 37–54)
EGFRCR SERPLBLD CKD-EPI 2021: 118 ML/MIN/1.73
EOSINOPHIL # BLD AUTO: 0.27 10*3/MM3 (ref 0–0.4)
EOSINOPHIL NFR BLD AUTO: 1.8 % (ref 0.3–6.2)
ERYTHROCYTE [DISTWIDTH] IN BLOOD BY AUTOMATED COUNT: 13.7 % (ref 12.3–15.4)
GLOBULIN UR ELPH-MCNC: 4.7 GM/DL
GLUCOSE SERPL-MCNC: 117 MG/DL (ref 65–99)
HCT VFR BLD AUTO: 39.9 % (ref 37.5–51)
HGB BLD-MCNC: 13.4 G/DL (ref 13–17.7)
IMM GRANULOCYTES # BLD AUTO: 0.46 10*3/MM3 (ref 0–0.05)
IMM GRANULOCYTES NFR BLD AUTO: 3.1 % (ref 0–0.5)
LYMPHOCYTES # BLD AUTO: 3.92 10*3/MM3 (ref 0.7–3.1)
LYMPHOCYTES NFR BLD AUTO: 26.2 % (ref 19.6–45.3)
MCH RBC QN AUTO: 30.9 PG (ref 26.6–33)
MCHC RBC AUTO-ENTMCNC: 33.6 G/DL (ref 31.5–35.7)
MCV RBC AUTO: 92.1 FL (ref 79–97)
MONOCYTES # BLD AUTO: 0.98 10*3/MM3 (ref 0.1–0.9)
MONOCYTES NFR BLD AUTO: 6.5 % (ref 5–12)
NEUTROPHILS NFR BLD AUTO: 61.7 % (ref 42.7–76)
NEUTROPHILS NFR BLD AUTO: 9.25 10*3/MM3 (ref 1.7–7)
NRBC BLD AUTO-RTO: 0 /100 WBC (ref 0–0.2)
PLATELET # BLD AUTO: 480 10*3/MM3 (ref 140–450)
PMV BLD AUTO: 9.8 FL (ref 6–12)
POTASSIUM SERPL-SCNC: 3.8 MMOL/L (ref 3.5–5.2)
PROT SERPL-MCNC: 7.3 G/DL (ref 6–8.5)
RBC # BLD AUTO: 4.33 10*6/MM3 (ref 4.14–5.8)
SODIUM SERPL-SCNC: 134 MMOL/L (ref 136–145)
VANCOMYCIN TROUGH SERPL-MCNC: 13.3 MCG/ML (ref 5–20)
WBC NRBC COR # BLD: 14.99 10*3/MM3 (ref 3.4–10.8)

## 2022-09-10 PROCEDURE — 80053 COMPREHEN METABOLIC PANEL: CPT | Performed by: INTERNAL MEDICINE

## 2022-09-10 PROCEDURE — 99232 SBSQ HOSP IP/OBS MODERATE 35: CPT | Performed by: PHYSICIAN ASSISTANT

## 2022-09-10 PROCEDURE — 85025 COMPLETE CBC W/AUTO DIFF WBC: CPT | Performed by: INTERNAL MEDICINE

## 2022-09-10 PROCEDURE — 80202 ASSAY OF VANCOMYCIN: CPT | Performed by: INTERNAL MEDICINE

## 2022-09-10 PROCEDURE — 86140 C-REACTIVE PROTEIN: CPT | Performed by: INTERNAL MEDICINE

## 2022-09-11 ENCOUNTER — OUTSIDE FACILITY SERVICE (OUTPATIENT)
Dept: INFECTIOUS DISEASES | Facility: CLINIC | Age: 42
End: 2022-09-11

## 2022-09-11 LAB
ALBUMIN SERPL-MCNC: 2.43 G/DL (ref 3.5–5.2)
ALBUMIN/GLOB SERPL: 0.5 G/DL
ALP SERPL-CCNC: 84 U/L (ref 39–117)
ALT SERPL W P-5'-P-CCNC: 46 U/L (ref 1–41)
ANION GAP SERPL CALCULATED.3IONS-SCNC: 10.2 MMOL/L (ref 5–15)
AST SERPL-CCNC: 32 U/L (ref 1–40)
BACTERIA SPEC AEROBE CULT: NORMAL
BACTERIA SPEC AEROBE CULT: NORMAL
BASOPHILS # BLD AUTO: 0.13 10*3/MM3 (ref 0–0.2)
BASOPHILS NFR BLD AUTO: 0.9 % (ref 0–1.5)
BILIRUB SERPL-MCNC: 0.3 MG/DL (ref 0–1.2)
BUN SERPL-MCNC: 16 MG/DL (ref 6–20)
BUN/CREAT SERPL: 21.9 (ref 7–25)
CALCIUM SPEC-SCNC: 9.1 MG/DL (ref 8.6–10.5)
CHLORIDE SERPL-SCNC: 104 MMOL/L (ref 98–107)
CO2 SERPL-SCNC: 20.8 MMOL/L (ref 22–29)
CREAT SERPL-MCNC: 0.73 MG/DL (ref 0.76–1.27)
CRP SERPL-MCNC: 0.92 MG/DL (ref 0–0.5)
DEPRECATED RDW RBC AUTO: 49.6 FL (ref 37–54)
EGFRCR SERPLBLD CKD-EPI 2021: 118 ML/MIN/1.73
EOSINOPHIL # BLD AUTO: 0.27 10*3/MM3 (ref 0–0.4)
EOSINOPHIL NFR BLD AUTO: 1.9 % (ref 0.3–6.2)
ERYTHROCYTE [DISTWIDTH] IN BLOOD BY AUTOMATED COUNT: 14.2 % (ref 12.3–15.4)
GLOBULIN UR ELPH-MCNC: 4.6 GM/DL
GLUCOSE SERPL-MCNC: 107 MG/DL (ref 65–99)
HCT VFR BLD AUTO: 40.9 % (ref 37.5–51)
HGB BLD-MCNC: 13.2 G/DL (ref 13–17.7)
IMM GRANULOCYTES # BLD AUTO: 0.38 10*3/MM3 (ref 0–0.05)
IMM GRANULOCYTES NFR BLD AUTO: 2.6 % (ref 0–0.5)
LYMPHOCYTES # BLD AUTO: 3.58 10*3/MM3 (ref 0.7–3.1)
LYMPHOCYTES NFR BLD AUTO: 24.9 % (ref 19.6–45.3)
MCH RBC QN AUTO: 30.8 PG (ref 26.6–33)
MCHC RBC AUTO-ENTMCNC: 32.3 G/DL (ref 31.5–35.7)
MCV RBC AUTO: 95.3 FL (ref 79–97)
MONOCYTES # BLD AUTO: 0.88 10*3/MM3 (ref 0.1–0.9)
MONOCYTES NFR BLD AUTO: 6.1 % (ref 5–12)
NEUTROPHILS NFR BLD AUTO: 63.6 % (ref 42.7–76)
NEUTROPHILS NFR BLD AUTO: 9.11 10*3/MM3 (ref 1.7–7)
NRBC BLD AUTO-RTO: 0 /100 WBC (ref 0–0.2)
PLATELET # BLD AUTO: 442 10*3/MM3 (ref 140–450)
PMV BLD AUTO: 9 FL (ref 6–12)
POTASSIUM SERPL-SCNC: 3.8 MMOL/L (ref 3.5–5.2)
PROT SERPL-MCNC: 7 G/DL (ref 6–8.5)
RBC # BLD AUTO: 4.29 10*6/MM3 (ref 4.14–5.8)
SODIUM SERPL-SCNC: 135 MMOL/L (ref 136–145)
WBC NRBC COR # BLD: 14.35 10*3/MM3 (ref 3.4–10.8)

## 2022-09-11 PROCEDURE — 80053 COMPREHEN METABOLIC PANEL: CPT | Performed by: INTERNAL MEDICINE

## 2022-09-11 PROCEDURE — 85025 COMPLETE CBC W/AUTO DIFF WBC: CPT | Performed by: INTERNAL MEDICINE

## 2022-09-11 PROCEDURE — 99232 SBSQ HOSP IP/OBS MODERATE 35: CPT | Performed by: PHYSICIAN ASSISTANT

## 2022-09-11 PROCEDURE — 86140 C-REACTIVE PROTEIN: CPT | Performed by: INTERNAL MEDICINE

## 2022-09-12 ENCOUNTER — OUTSIDE FACILITY SERVICE (OUTPATIENT)
Dept: INFECTIOUS DISEASES | Facility: CLINIC | Age: 42
End: 2022-09-12

## 2022-09-12 LAB
ALBUMIN SERPL-MCNC: 2.36 G/DL (ref 3.5–5.2)
ALBUMIN/GLOB SERPL: 0.6 G/DL
ALP SERPL-CCNC: 85 U/L (ref 39–117)
ALT SERPL W P-5'-P-CCNC: 38 U/L (ref 1–41)
ANION GAP SERPL CALCULATED.3IONS-SCNC: 10 MMOL/L (ref 5–15)
AST SERPL-CCNC: 40 U/L (ref 1–40)
BACTERIA SPEC AEROBE CULT: ABNORMAL
BASOPHILS # BLD AUTO: 0.1 10*3/MM3 (ref 0–0.2)
BASOPHILS NFR BLD AUTO: 0.8 % (ref 0–1.5)
BILIRUB SERPL-MCNC: 0.3 MG/DL (ref 0–1.2)
BUN SERPL-MCNC: 15 MG/DL (ref 6–20)
BUN/CREAT SERPL: 20.5 (ref 7–25)
CALCIUM SPEC-SCNC: 8.6 MG/DL (ref 8.6–10.5)
CHLORIDE SERPL-SCNC: 106 MMOL/L (ref 98–107)
CO2 SERPL-SCNC: 18 MMOL/L (ref 22–29)
CREAT SERPL-MCNC: 0.73 MG/DL (ref 0.76–1.27)
CRP SERPL-MCNC: 0.68 MG/DL (ref 0–0.5)
DEPRECATED RDW RBC AUTO: 51.6 FL (ref 37–54)
EGFRCR SERPLBLD CKD-EPI 2021: 118 ML/MIN/1.73
EOSINOPHIL # BLD AUTO: 0.22 10*3/MM3 (ref 0–0.4)
EOSINOPHIL NFR BLD AUTO: 1.8 % (ref 0.3–6.2)
ERYTHROCYTE [DISTWIDTH] IN BLOOD BY AUTOMATED COUNT: 14.3 % (ref 12.3–15.4)
GLOBULIN UR ELPH-MCNC: 4 GM/DL
GLUCOSE SERPL-MCNC: 102 MG/DL (ref 65–99)
GRAM STN SPEC: ABNORMAL
HCT VFR BLD AUTO: 39.3 % (ref 37.5–51)
HGB BLD-MCNC: 12.4 G/DL (ref 13–17.7)
IMM GRANULOCYTES # BLD AUTO: 0.27 10*3/MM3 (ref 0–0.05)
IMM GRANULOCYTES NFR BLD AUTO: 2.2 % (ref 0–0.5)
LYMPHOCYTES # BLD AUTO: 4.12 10*3/MM3 (ref 0.7–3.1)
LYMPHOCYTES NFR BLD AUTO: 33.1 % (ref 19.6–45.3)
MCH RBC QN AUTO: 30.8 PG (ref 26.6–33)
MCHC RBC AUTO-ENTMCNC: 31.6 G/DL (ref 31.5–35.7)
MCV RBC AUTO: 97.8 FL (ref 79–97)
MONOCYTES # BLD AUTO: 0.67 10*3/MM3 (ref 0.1–0.9)
MONOCYTES NFR BLD AUTO: 5.4 % (ref 5–12)
NEUTROPHILS NFR BLD AUTO: 56.7 % (ref 42.7–76)
NEUTROPHILS NFR BLD AUTO: 7.08 10*3/MM3 (ref 1.7–7)
NRBC BLD AUTO-RTO: 0 /100 WBC (ref 0–0.2)
PLATELET # BLD AUTO: 344 10*3/MM3 (ref 140–450)
PMV BLD AUTO: 9.2 FL (ref 6–12)
POTASSIUM SERPL-SCNC: 4 MMOL/L (ref 3.5–5.2)
PREALB SERPL-MCNC: 15.7 MG/DL (ref 20–40)
PROT SERPL-MCNC: 6.4 G/DL (ref 6–8.5)
QT INTERVAL: 366 MS
QTC INTERVAL: 419 MS
RBC # BLD AUTO: 4.02 10*6/MM3 (ref 4.14–5.8)
SODIUM SERPL-SCNC: 134 MMOL/L (ref 136–145)
WBC NRBC COR # BLD: 12.46 10*3/MM3 (ref 3.4–10.8)

## 2022-09-12 PROCEDURE — 85025 COMPLETE CBC W/AUTO DIFF WBC: CPT | Performed by: INTERNAL MEDICINE

## 2022-09-12 PROCEDURE — 99232 SBSQ HOSP IP/OBS MODERATE 35: CPT | Performed by: INTERNAL MEDICINE

## 2022-09-12 PROCEDURE — 80053 COMPREHEN METABOLIC PANEL: CPT | Performed by: INTERNAL MEDICINE

## 2022-09-12 PROCEDURE — 97165 OT EVAL LOW COMPLEX 30 MIN: CPT

## 2022-09-12 PROCEDURE — 84134 ASSAY OF PREALBUMIN: CPT | Performed by: PHYSICIAN ASSISTANT

## 2022-09-12 PROCEDURE — 86140 C-REACTIVE PROTEIN: CPT | Performed by: INTERNAL MEDICINE

## 2022-09-13 ENCOUNTER — OUTSIDE FACILITY SERVICE (OUTPATIENT)
Dept: INFECTIOUS DISEASES | Facility: CLINIC | Age: 42
End: 2022-09-13

## 2022-09-13 LAB
ALBUMIN SERPL-MCNC: 2.61 G/DL (ref 3.5–5.2)
ALBUMIN/GLOB SERPL: 0.6 G/DL
ALP SERPL-CCNC: 93 U/L (ref 39–117)
ALT SERPL W P-5'-P-CCNC: 46 U/L (ref 1–41)
ANION GAP SERPL CALCULATED.3IONS-SCNC: 9.7 MMOL/L (ref 5–15)
AST SERPL-CCNC: 43 U/L (ref 1–40)
BASOPHILS # BLD AUTO: 0.1 10*3/MM3 (ref 0–0.2)
BASOPHILS NFR BLD AUTO: 0.6 % (ref 0–1.5)
BILIRUB SERPL-MCNC: 0.3 MG/DL (ref 0–1.2)
BUN SERPL-MCNC: 20 MG/DL (ref 6–20)
BUN/CREAT SERPL: 24.4 (ref 7–25)
CALCIUM SPEC-SCNC: 9.4 MG/DL (ref 8.6–10.5)
CHLORIDE SERPL-SCNC: 102 MMOL/L (ref 98–107)
CO2 SERPL-SCNC: 22.3 MMOL/L (ref 22–29)
CREAT SERPL-MCNC: 0.82 MG/DL (ref 0.76–1.27)
CRP SERPL-MCNC: 0.87 MG/DL (ref 0–0.5)
DEPRECATED RDW RBC AUTO: 48.9 FL (ref 37–54)
EGFRCR SERPLBLD CKD-EPI 2021: 113.9 ML/MIN/1.73
EOSINOPHIL # BLD AUTO: 0.18 10*3/MM3 (ref 0–0.4)
EOSINOPHIL NFR BLD AUTO: 1.1 % (ref 0.3–6.2)
ERYTHROCYTE [DISTWIDTH] IN BLOOD BY AUTOMATED COUNT: 14.1 % (ref 12.3–15.4)
GLOBULIN UR ELPH-MCNC: 4.6 GM/DL
GLUCOSE SERPL-MCNC: 99 MG/DL (ref 65–99)
HCT VFR BLD AUTO: 40.1 % (ref 37.5–51)
HGB BLD-MCNC: 12.8 G/DL (ref 13–17.7)
IMM GRANULOCYTES # BLD AUTO: 0.19 10*3/MM3 (ref 0–0.05)
IMM GRANULOCYTES NFR BLD AUTO: 1.1 % (ref 0–0.5)
LYMPHOCYTES # BLD AUTO: 3.92 10*3/MM3 (ref 0.7–3.1)
LYMPHOCYTES NFR BLD AUTO: 23.3 % (ref 19.6–45.3)
MCH RBC QN AUTO: 30.1 PG (ref 26.6–33)
MCHC RBC AUTO-ENTMCNC: 31.9 G/DL (ref 31.5–35.7)
MCV RBC AUTO: 94.4 FL (ref 79–97)
MONOCYTES # BLD AUTO: 0.98 10*3/MM3 (ref 0.1–0.9)
MONOCYTES NFR BLD AUTO: 5.8 % (ref 5–12)
NEUTROPHILS NFR BLD AUTO: 11.43 10*3/MM3 (ref 1.7–7)
NEUTROPHILS NFR BLD AUTO: 68.1 % (ref 42.7–76)
NRBC BLD AUTO-RTO: 0 /100 WBC (ref 0–0.2)
PLATELET # BLD AUTO: 355 10*3/MM3 (ref 140–450)
PMV BLD AUTO: 9.1 FL (ref 6–12)
POTASSIUM SERPL-SCNC: 4 MMOL/L (ref 3.5–5.2)
PROT SERPL-MCNC: 7.2 G/DL (ref 6–8.5)
RBC # BLD AUTO: 4.25 10*6/MM3 (ref 4.14–5.8)
SODIUM SERPL-SCNC: 134 MMOL/L (ref 136–145)
VANCOMYCIN SERPL-MCNC: 13.5 MCG/ML (ref 5–40)
WBC NRBC COR # BLD: 16.8 10*3/MM3 (ref 3.4–10.8)

## 2022-09-13 PROCEDURE — 80202 ASSAY OF VANCOMYCIN: CPT | Performed by: INTERNAL MEDICINE

## 2022-09-13 PROCEDURE — 99233 SBSQ HOSP IP/OBS HIGH 50: CPT | Performed by: INTERNAL MEDICINE

## 2022-09-13 PROCEDURE — 86140 C-REACTIVE PROTEIN: CPT | Performed by: INTERNAL MEDICINE

## 2022-09-13 PROCEDURE — 80053 COMPREHEN METABOLIC PANEL: CPT | Performed by: INTERNAL MEDICINE

## 2022-09-13 PROCEDURE — 85025 COMPLETE CBC W/AUTO DIFF WBC: CPT | Performed by: INTERNAL MEDICINE

## 2022-09-14 LAB
ALBUMIN SERPL-MCNC: 2.92 G/DL (ref 3.5–5.2)
ALBUMIN/GLOB SERPL: 0.6 G/DL
ALP SERPL-CCNC: 106 U/L (ref 39–117)
ALT SERPL W P-5'-P-CCNC: 55 U/L (ref 1–41)
ANION GAP SERPL CALCULATED.3IONS-SCNC: 11.4 MMOL/L (ref 5–15)
AST SERPL-CCNC: 48 U/L (ref 1–40)
BASOPHILS # BLD AUTO: 0.17 10*3/MM3 (ref 0–0.2)
BASOPHILS NFR BLD AUTO: 0.9 % (ref 0–1.5)
BILIRUB SERPL-MCNC: 0.4 MG/DL (ref 0–1.2)
BUN SERPL-MCNC: 17 MG/DL (ref 6–20)
BUN/CREAT SERPL: 22.7 (ref 7–25)
CALCIUM SPEC-SCNC: 9.6 MG/DL (ref 8.6–10.5)
CHLORIDE SERPL-SCNC: 102 MMOL/L (ref 98–107)
CO2 SERPL-SCNC: 20.6 MMOL/L (ref 22–29)
CREAT SERPL-MCNC: 0.75 MG/DL (ref 0.76–1.27)
CRP SERPL-MCNC: 1.48 MG/DL (ref 0–0.5)
DEPRECATED RDW RBC AUTO: 49.3 FL (ref 37–54)
EGFRCR SERPLBLD CKD-EPI 2021: 117 ML/MIN/1.73
EOSINOPHIL # BLD AUTO: 0.22 10*3/MM3 (ref 0–0.4)
EOSINOPHIL NFR BLD AUTO: 1.2 % (ref 0.3–6.2)
ERYTHROCYTE [DISTWIDTH] IN BLOOD BY AUTOMATED COUNT: 14.3 % (ref 12.3–15.4)
GLOBULIN UR ELPH-MCNC: 4.7 GM/DL
GLUCOSE SERPL-MCNC: 113 MG/DL (ref 65–99)
HCT VFR BLD AUTO: 43.1 % (ref 37.5–51)
HGB BLD-MCNC: 14.1 G/DL (ref 13–17.7)
IMM GRANULOCYTES # BLD AUTO: 0.17 10*3/MM3 (ref 0–0.05)
IMM GRANULOCYTES NFR BLD AUTO: 0.9 % (ref 0–0.5)
LYMPHOCYTES # BLD AUTO: 3.8 10*3/MM3 (ref 0.7–3.1)
LYMPHOCYTES NFR BLD AUTO: 20.3 % (ref 19.6–45.3)
MCH RBC QN AUTO: 31.1 PG (ref 26.6–33)
MCHC RBC AUTO-ENTMCNC: 32.7 G/DL (ref 31.5–35.7)
MCV RBC AUTO: 95.1 FL (ref 79–97)
MONOCYTES # BLD AUTO: 1.11 10*3/MM3 (ref 0.1–0.9)
MONOCYTES NFR BLD AUTO: 5.9 % (ref 5–12)
NEUTROPHILS NFR BLD AUTO: 13.29 10*3/MM3 (ref 1.7–7)
NEUTROPHILS NFR BLD AUTO: 70.8 % (ref 42.7–76)
NRBC BLD AUTO-RTO: 0 /100 WBC (ref 0–0.2)
PLATELET # BLD AUTO: 358 10*3/MM3 (ref 140–450)
PMV BLD AUTO: 8.9 FL (ref 6–12)
POTASSIUM SERPL-SCNC: 4.2 MMOL/L (ref 3.5–5.2)
PROT SERPL-MCNC: 7.6 G/DL (ref 6–8.5)
RBC # BLD AUTO: 4.53 10*6/MM3 (ref 4.14–5.8)
SODIUM SERPL-SCNC: 134 MMOL/L (ref 136–145)
WBC NRBC COR # BLD: 18.76 10*3/MM3 (ref 3.4–10.8)

## 2022-09-14 PROCEDURE — 86140 C-REACTIVE PROTEIN: CPT | Performed by: INTERNAL MEDICINE

## 2022-09-14 PROCEDURE — 80053 COMPREHEN METABOLIC PANEL: CPT | Performed by: INTERNAL MEDICINE

## 2022-09-14 PROCEDURE — 85025 COMPLETE CBC W/AUTO DIFF WBC: CPT | Performed by: INTERNAL MEDICINE

## 2022-09-15 ENCOUNTER — OUTSIDE FACILITY SERVICE (OUTPATIENT)
Dept: INFECTIOUS DISEASES | Facility: CLINIC | Age: 42
End: 2022-09-15

## 2022-09-15 LAB
ALBUMIN SERPL-MCNC: 2.91 G/DL (ref 3.5–5.2)
ALBUMIN/GLOB SERPL: 0.6 G/DL
ALP SERPL-CCNC: 102 U/L (ref 39–117)
ALT SERPL W P-5'-P-CCNC: 61 U/L (ref 1–41)
ANION GAP SERPL CALCULATED.3IONS-SCNC: 10.8 MMOL/L (ref 5–15)
AST SERPL-CCNC: 48 U/L (ref 1–40)
BASOPHILS # BLD AUTO: 0.16 10*3/MM3 (ref 0–0.2)
BASOPHILS NFR BLD AUTO: 1 % (ref 0–1.5)
BILIRUB SERPL-MCNC: 0.4 MG/DL (ref 0–1.2)
BUN SERPL-MCNC: 23 MG/DL (ref 6–20)
BUN/CREAT SERPL: 26.4 (ref 7–25)
CALCIUM SPEC-SCNC: 9.9 MG/DL (ref 8.6–10.5)
CHLORIDE SERPL-SCNC: 100 MMOL/L (ref 98–107)
CO2 SERPL-SCNC: 23.2 MMOL/L (ref 22–29)
CREAT SERPL-MCNC: 0.87 MG/DL (ref 0.76–1.27)
CRP SERPL-MCNC: 2.3 MG/DL (ref 0–0.5)
DEPRECATED RDW RBC AUTO: 49.1 FL (ref 37–54)
EGFRCR SERPLBLD CKD-EPI 2021: 111.9 ML/MIN/1.73
EOSINOPHIL # BLD AUTO: 0.21 10*3/MM3 (ref 0–0.4)
EOSINOPHIL NFR BLD AUTO: 1.3 % (ref 0.3–6.2)
ERYTHROCYTE [DISTWIDTH] IN BLOOD BY AUTOMATED COUNT: 13.9 % (ref 12.3–15.4)
GLOBULIN UR ELPH-MCNC: 5 GM/DL
GLUCOSE SERPL-MCNC: 105 MG/DL (ref 65–99)
HCT VFR BLD AUTO: 42.2 % (ref 37.5–51)
HGB BLD-MCNC: 13.5 G/DL (ref 13–17.7)
IMM GRANULOCYTES # BLD AUTO: 0.1 10*3/MM3 (ref 0–0.05)
IMM GRANULOCYTES NFR BLD AUTO: 0.6 % (ref 0–0.5)
LYMPHOCYTES # BLD AUTO: 3.89 10*3/MM3 (ref 0.7–3.1)
LYMPHOCYTES NFR BLD AUTO: 24.4 % (ref 19.6–45.3)
MCH RBC QN AUTO: 30.5 PG (ref 26.6–33)
MCHC RBC AUTO-ENTMCNC: 32 G/DL (ref 31.5–35.7)
MCV RBC AUTO: 95.5 FL (ref 79–97)
MONOCYTES # BLD AUTO: 0.97 10*3/MM3 (ref 0.1–0.9)
MONOCYTES NFR BLD AUTO: 6.1 % (ref 5–12)
NEUTROPHILS NFR BLD AUTO: 10.63 10*3/MM3 (ref 1.7–7)
NEUTROPHILS NFR BLD AUTO: 66.6 % (ref 42.7–76)
NRBC BLD AUTO-RTO: 0 /100 WBC (ref 0–0.2)
PLATELET # BLD AUTO: 317 10*3/MM3 (ref 140–450)
PMV BLD AUTO: 9 FL (ref 6–12)
POTASSIUM SERPL-SCNC: 4.3 MMOL/L (ref 3.5–5.2)
PROT SERPL-MCNC: 7.9 G/DL (ref 6–8.5)
RBC # BLD AUTO: 4.42 10*6/MM3 (ref 4.14–5.8)
SODIUM SERPL-SCNC: 134 MMOL/L (ref 136–145)
WBC NRBC COR # BLD: 15.96 10*3/MM3 (ref 3.4–10.8)

## 2022-09-15 PROCEDURE — 99232 SBSQ HOSP IP/OBS MODERATE 35: CPT | Performed by: INTERNAL MEDICINE

## 2022-09-15 PROCEDURE — 80053 COMPREHEN METABOLIC PANEL: CPT | Performed by: INTERNAL MEDICINE

## 2022-09-15 PROCEDURE — 85025 COMPLETE CBC W/AUTO DIFF WBC: CPT | Performed by: INTERNAL MEDICINE

## 2022-09-15 PROCEDURE — 86140 C-REACTIVE PROTEIN: CPT | Performed by: INTERNAL MEDICINE

## 2022-09-16 ENCOUNTER — OUTSIDE FACILITY SERVICE (OUTPATIENT)
Dept: INFECTIOUS DISEASES | Facility: CLINIC | Age: 42
End: 2022-09-16

## 2022-09-16 LAB
ALBUMIN SERPL-MCNC: 3.13 G/DL (ref 3.5–5.2)
ALBUMIN/GLOB SERPL: 0.7 G/DL
ALP SERPL-CCNC: 105 U/L (ref 39–117)
ALT SERPL W P-5'-P-CCNC: 61 U/L (ref 1–41)
ANION GAP SERPL CALCULATED.3IONS-SCNC: 11.2 MMOL/L (ref 5–15)
AST SERPL-CCNC: 44 U/L (ref 1–40)
BASOPHILS # BLD AUTO: 0.13 10*3/MM3 (ref 0–0.2)
BASOPHILS NFR BLD AUTO: 0.9 % (ref 0–1.5)
BILIRUB SERPL-MCNC: 0.3 MG/DL (ref 0–1.2)
BUN SERPL-MCNC: 20 MG/DL (ref 6–20)
BUN/CREAT SERPL: 29.9 (ref 7–25)
CALCIUM SPEC-SCNC: 9.5 MG/DL (ref 8.6–10.5)
CHLORIDE SERPL-SCNC: 101 MMOL/L (ref 98–107)
CO2 SERPL-SCNC: 23.8 MMOL/L (ref 22–29)
CREAT SERPL-MCNC: 0.67 MG/DL (ref 0.76–1.27)
CRP SERPL-MCNC: 1.68 MG/DL (ref 0–0.5)
DEPRECATED RDW RBC AUTO: 47.7 FL (ref 37–54)
EGFRCR SERPLBLD CKD-EPI 2021: 121 ML/MIN/1.73
EOSINOPHIL # BLD AUTO: 0.14 10*3/MM3 (ref 0–0.4)
EOSINOPHIL NFR BLD AUTO: 1 % (ref 0.3–6.2)
ERYTHROCYTE [DISTWIDTH] IN BLOOD BY AUTOMATED COUNT: 14 % (ref 12.3–15.4)
GLOBULIN UR ELPH-MCNC: 4.4 GM/DL
GLUCOSE SERPL-MCNC: 106 MG/DL (ref 65–99)
HCT VFR BLD AUTO: 40.3 % (ref 37.5–51)
HGB BLD-MCNC: 13.3 G/DL (ref 13–17.7)
IMM GRANULOCYTES # BLD AUTO: 0.07 10*3/MM3 (ref 0–0.05)
IMM GRANULOCYTES NFR BLD AUTO: 0.5 % (ref 0–0.5)
LYMPHOCYTES # BLD AUTO: 3.15 10*3/MM3 (ref 0.7–3.1)
LYMPHOCYTES NFR BLD AUTO: 22 % (ref 19.6–45.3)
MCH RBC QN AUTO: 30.9 PG (ref 26.6–33)
MCHC RBC AUTO-ENTMCNC: 33 G/DL (ref 31.5–35.7)
MCV RBC AUTO: 93.7 FL (ref 79–97)
MONOCYTES # BLD AUTO: 1 10*3/MM3 (ref 0.1–0.9)
MONOCYTES NFR BLD AUTO: 7 % (ref 5–12)
NEUTROPHILS NFR BLD AUTO: 68.6 % (ref 42.7–76)
NEUTROPHILS NFR BLD AUTO: 9.84 10*3/MM3 (ref 1.7–7)
NRBC BLD AUTO-RTO: 0 /100 WBC (ref 0–0.2)
PLATELET # BLD AUTO: 283 10*3/MM3 (ref 140–450)
PMV BLD AUTO: 8.8 FL (ref 6–12)
POTASSIUM SERPL-SCNC: 4.4 MMOL/L (ref 3.5–5.2)
PROT SERPL-MCNC: 7.5 G/DL (ref 6–8.5)
RBC # BLD AUTO: 4.3 10*6/MM3 (ref 4.14–5.8)
SODIUM SERPL-SCNC: 136 MMOL/L (ref 136–145)
VANCOMYCIN TROUGH SERPL-MCNC: 17 MCG/ML (ref 5–20)
WBC NRBC COR # BLD: 14.33 10*3/MM3 (ref 3.4–10.8)

## 2022-09-16 PROCEDURE — 86140 C-REACTIVE PROTEIN: CPT | Performed by: INTERNAL MEDICINE

## 2022-09-16 PROCEDURE — 80202 ASSAY OF VANCOMYCIN: CPT | Performed by: PHYSICIAN ASSISTANT

## 2022-09-16 PROCEDURE — 85025 COMPLETE CBC W/AUTO DIFF WBC: CPT | Performed by: INTERNAL MEDICINE

## 2022-09-16 PROCEDURE — 80053 COMPREHEN METABOLIC PANEL: CPT | Performed by: INTERNAL MEDICINE

## 2022-09-16 PROCEDURE — 99232 SBSQ HOSP IP/OBS MODERATE 35: CPT | Performed by: INTERNAL MEDICINE

## 2022-09-19 ENCOUNTER — OUTSIDE FACILITY SERVICE (OUTPATIENT)
Dept: INFECTIOUS DISEASES | Facility: CLINIC | Age: 42
End: 2022-09-19

## 2022-09-19 LAB
ALBUMIN SERPL-MCNC: 2.91 G/DL (ref 3.5–5.2)
ALBUMIN/GLOB SERPL: 0.6 G/DL
ALP SERPL-CCNC: 115 U/L (ref 39–117)
ALT SERPL W P-5'-P-CCNC: 72 U/L (ref 1–41)
ANION GAP SERPL CALCULATED.3IONS-SCNC: 10.1 MMOL/L (ref 5–15)
AST SERPL-CCNC: 58 U/L (ref 1–40)
BASOPHILS # BLD AUTO: 0.1 10*3/MM3 (ref 0–0.2)
BASOPHILS NFR BLD AUTO: 0.9 % (ref 0–1.5)
BILIRUB SERPL-MCNC: 0.3 MG/DL (ref 0–1.2)
BUN SERPL-MCNC: 20 MG/DL (ref 6–20)
BUN/CREAT SERPL: 26.7 (ref 7–25)
CALCIUM SPEC-SCNC: 9.8 MG/DL (ref 8.6–10.5)
CHLORIDE SERPL-SCNC: 102 MMOL/L (ref 98–107)
CO2 SERPL-SCNC: 24.9 MMOL/L (ref 22–29)
CREAT SERPL-MCNC: 0.75 MG/DL (ref 0.76–1.27)
CRP SERPL-MCNC: 1.35 MG/DL (ref 0–0.5)
DEPRECATED RDW RBC AUTO: 47.4 FL (ref 37–54)
EGFRCR SERPLBLD CKD-EPI 2021: 117 ML/MIN/1.73
EOSINOPHIL # BLD AUTO: 0.16 10*3/MM3 (ref 0–0.4)
EOSINOPHIL NFR BLD AUTO: 1.4 % (ref 0.3–6.2)
ERYTHROCYTE [DISTWIDTH] IN BLOOD BY AUTOMATED COUNT: 13.6 % (ref 12.3–15.4)
GLOBULIN UR ELPH-MCNC: 4.6 GM/DL
GLUCOSE SERPL-MCNC: 103 MG/DL (ref 65–99)
HCT VFR BLD AUTO: 38.6 % (ref 37.5–51)
HGB BLD-MCNC: 12.5 G/DL (ref 13–17.7)
IMM GRANULOCYTES # BLD AUTO: 0.05 10*3/MM3 (ref 0–0.05)
IMM GRANULOCYTES NFR BLD AUTO: 0.4 % (ref 0–0.5)
LYMPHOCYTES # BLD AUTO: 2.75 10*3/MM3 (ref 0.7–3.1)
LYMPHOCYTES NFR BLD AUTO: 24.6 % (ref 19.6–45.3)
MCH RBC QN AUTO: 30.6 PG (ref 26.6–33)
MCHC RBC AUTO-ENTMCNC: 32.4 G/DL (ref 31.5–35.7)
MCV RBC AUTO: 94.6 FL (ref 79–97)
MONOCYTES # BLD AUTO: 0.96 10*3/MM3 (ref 0.1–0.9)
MONOCYTES NFR BLD AUTO: 8.6 % (ref 5–12)
NEUTROPHILS NFR BLD AUTO: 64.1 % (ref 42.7–76)
NEUTROPHILS NFR BLD AUTO: 7.14 10*3/MM3 (ref 1.7–7)
NRBC BLD AUTO-RTO: 0 /100 WBC (ref 0–0.2)
PLATELET # BLD AUTO: 245 10*3/MM3 (ref 140–450)
PMV BLD AUTO: 9.5 FL (ref 6–12)
POTASSIUM SERPL-SCNC: 4.2 MMOL/L (ref 3.5–5.2)
PREALB SERPL-MCNC: 17.2 MG/DL (ref 20–40)
PROT SERPL-MCNC: 7.5 G/DL (ref 6–8.5)
RBC # BLD AUTO: 4.08 10*6/MM3 (ref 4.14–5.8)
SODIUM SERPL-SCNC: 137 MMOL/L (ref 136–145)
WBC NRBC COR # BLD: 11.16 10*3/MM3 (ref 3.4–10.8)

## 2022-09-19 PROCEDURE — 84134 ASSAY OF PREALBUMIN: CPT | Performed by: PHYSICIAN ASSISTANT

## 2022-09-19 PROCEDURE — 85025 COMPLETE CBC W/AUTO DIFF WBC: CPT | Performed by: PHYSICIAN ASSISTANT

## 2022-09-19 PROCEDURE — 99232 SBSQ HOSP IP/OBS MODERATE 35: CPT | Performed by: INTERNAL MEDICINE

## 2022-09-19 PROCEDURE — 86140 C-REACTIVE PROTEIN: CPT | Performed by: PHYSICIAN ASSISTANT

## 2022-09-19 PROCEDURE — 80053 COMPREHEN METABOLIC PANEL: CPT | Performed by: PHYSICIAN ASSISTANT

## 2022-09-20 ENCOUNTER — OUTSIDE FACILITY SERVICE (OUTPATIENT)
Dept: INFECTIOUS DISEASES | Facility: CLINIC | Age: 42
End: 2022-09-20

## 2022-09-20 PROCEDURE — 99232 SBSQ HOSP IP/OBS MODERATE 35: CPT | Performed by: NURSE PRACTITIONER

## 2022-09-21 LAB
ALBUMIN SERPL-MCNC: 2.93 G/DL (ref 3.5–5.2)
ALBUMIN/GLOB SERPL: 0.7 G/DL
ALP SERPL-CCNC: 114 U/L (ref 39–117)
ALT SERPL W P-5'-P-CCNC: 72 U/L (ref 1–41)
ANION GAP SERPL CALCULATED.3IONS-SCNC: 9.5 MMOL/L (ref 5–15)
AST SERPL-CCNC: 49 U/L (ref 1–40)
BASOPHILS # BLD AUTO: 0.08 10*3/MM3 (ref 0–0.2)
BASOPHILS NFR BLD AUTO: 0.8 % (ref 0–1.5)
BILIRUB SERPL-MCNC: 0.3 MG/DL (ref 0–1.2)
BUN SERPL-MCNC: 17 MG/DL (ref 6–20)
BUN/CREAT SERPL: 22.7 (ref 7–25)
CALCIUM SPEC-SCNC: 9.5 MG/DL (ref 8.6–10.5)
CHLORIDE SERPL-SCNC: 105 MMOL/L (ref 98–107)
CO2 SERPL-SCNC: 24.5 MMOL/L (ref 22–29)
CREAT SERPL-MCNC: 0.75 MG/DL (ref 0.76–1.27)
CRP SERPL-MCNC: 2.06 MG/DL (ref 0–0.5)
DEPRECATED RDW RBC AUTO: 46.1 FL (ref 37–54)
EGFRCR SERPLBLD CKD-EPI 2021: 117 ML/MIN/1.73
EOSINOPHIL # BLD AUTO: 0.25 10*3/MM3 (ref 0–0.4)
EOSINOPHIL NFR BLD AUTO: 2.4 % (ref 0.3–6.2)
ERYTHROCYTE [DISTWIDTH] IN BLOOD BY AUTOMATED COUNT: 13.5 % (ref 12.3–15.4)
GLOBULIN UR ELPH-MCNC: 4.3 GM/DL
GLUCOSE SERPL-MCNC: 113 MG/DL (ref 65–99)
HCT VFR BLD AUTO: 36.5 % (ref 37.5–51)
HGB BLD-MCNC: 11.8 G/DL (ref 13–17.7)
IMM GRANULOCYTES # BLD AUTO: 0.03 10*3/MM3 (ref 0–0.05)
IMM GRANULOCYTES NFR BLD AUTO: 0.3 % (ref 0–0.5)
LYMPHOCYTES # BLD AUTO: 2.61 10*3/MM3 (ref 0.7–3.1)
LYMPHOCYTES NFR BLD AUTO: 25.3 % (ref 19.6–45.3)
MCH RBC QN AUTO: 30.1 PG (ref 26.6–33)
MCHC RBC AUTO-ENTMCNC: 32.3 G/DL (ref 31.5–35.7)
MCV RBC AUTO: 93.1 FL (ref 79–97)
MONOCYTES # BLD AUTO: 0.93 10*3/MM3 (ref 0.1–0.9)
MONOCYTES NFR BLD AUTO: 9 % (ref 5–12)
NEUTROPHILS NFR BLD AUTO: 6.4 10*3/MM3 (ref 1.7–7)
NEUTROPHILS NFR BLD AUTO: 62.2 % (ref 42.7–76)
NRBC BLD AUTO-RTO: 0 /100 WBC (ref 0–0.2)
PLATELET # BLD AUTO: 254 10*3/MM3 (ref 140–450)
PMV BLD AUTO: 9.4 FL (ref 6–12)
POTASSIUM SERPL-SCNC: 4.1 MMOL/L (ref 3.5–5.2)
PROT SERPL-MCNC: 7.2 G/DL (ref 6–8.5)
RBC # BLD AUTO: 3.92 10*6/MM3 (ref 4.14–5.8)
SODIUM SERPL-SCNC: 139 MMOL/L (ref 136–145)
VANCOMYCIN TROUGH SERPL-MCNC: 11 MCG/ML (ref 5–20)
WBC NRBC COR # BLD: 10.3 10*3/MM3 (ref 3.4–10.8)

## 2022-09-21 PROCEDURE — 85025 COMPLETE CBC W/AUTO DIFF WBC: CPT | Performed by: PHYSICIAN ASSISTANT

## 2022-09-21 PROCEDURE — 80202 ASSAY OF VANCOMYCIN: CPT | Performed by: INTERNAL MEDICINE

## 2022-09-21 PROCEDURE — 80053 COMPREHEN METABOLIC PANEL: CPT | Performed by: PHYSICIAN ASSISTANT

## 2022-09-21 PROCEDURE — 86140 C-REACTIVE PROTEIN: CPT | Performed by: PHYSICIAN ASSISTANT

## 2022-09-22 ENCOUNTER — APPOINTMENT (OUTPATIENT)
Dept: ULTRASOUND IMAGING | Facility: HOSPITAL | Age: 42
End: 2022-09-22

## 2022-09-22 ENCOUNTER — OUTSIDE FACILITY SERVICE (OUTPATIENT)
Dept: INFECTIOUS DISEASES | Facility: CLINIC | Age: 42
End: 2022-09-22

## 2022-09-22 PROCEDURE — 99232 SBSQ HOSP IP/OBS MODERATE 35: CPT | Performed by: INTERNAL MEDICINE

## 2022-09-22 PROCEDURE — 76705 ECHO EXAM OF ABDOMEN: CPT

## 2022-09-22 PROCEDURE — 76705 ECHO EXAM OF ABDOMEN: CPT | Performed by: RADIOLOGY

## 2022-09-23 ENCOUNTER — OUTSIDE FACILITY SERVICE (OUTPATIENT)
Dept: INFECTIOUS DISEASES | Facility: CLINIC | Age: 42
End: 2022-09-23

## 2022-09-23 LAB
ALBUMIN SERPL-MCNC: 2.89 G/DL (ref 3.5–5.2)
ALBUMIN/GLOB SERPL: 0.7 G/DL
ALP SERPL-CCNC: 127 U/L (ref 39–117)
ALT SERPL W P-5'-P-CCNC: 71 U/L (ref 1–41)
ANION GAP SERPL CALCULATED.3IONS-SCNC: 11.5 MMOL/L (ref 5–15)
AST SERPL-CCNC: 51 U/L (ref 1–40)
BASOPHILS # BLD AUTO: 0.06 10*3/MM3 (ref 0–0.2)
BASOPHILS NFR BLD AUTO: 0.7 % (ref 0–1.5)
BILIRUB SERPL-MCNC: 0.3 MG/DL (ref 0–1.2)
BUN SERPL-MCNC: 18 MG/DL (ref 6–20)
BUN/CREAT SERPL: 25.7 (ref 7–25)
CALCIUM SPEC-SCNC: 9.4 MG/DL (ref 8.6–10.5)
CHLORIDE SERPL-SCNC: 104 MMOL/L (ref 98–107)
CO2 SERPL-SCNC: 22.5 MMOL/L (ref 22–29)
CREAT SERPL-MCNC: 0.7 MG/DL (ref 0.76–1.27)
CRP SERPL-MCNC: 4.32 MG/DL (ref 0–0.5)
DEPRECATED RDW RBC AUTO: 46.8 FL (ref 37–54)
EGFRCR SERPLBLD CKD-EPI 2021: 119.5 ML/MIN/1.73
EOSINOPHIL # BLD AUTO: 0.35 10*3/MM3 (ref 0–0.4)
EOSINOPHIL NFR BLD AUTO: 3.9 % (ref 0.3–6.2)
ERYTHROCYTE [DISTWIDTH] IN BLOOD BY AUTOMATED COUNT: 13.7 % (ref 12.3–15.4)
GLOBULIN UR ELPH-MCNC: 4.1 GM/DL
GLUCOSE SERPL-MCNC: 106 MG/DL (ref 65–99)
HCT VFR BLD AUTO: 35.1 % (ref 37.5–51)
HGB BLD-MCNC: 11.1 G/DL (ref 13–17.7)
IMM GRANULOCYTES # BLD AUTO: 0.04 10*3/MM3 (ref 0–0.05)
IMM GRANULOCYTES NFR BLD AUTO: 0.5 % (ref 0–0.5)
LYMPHOCYTES # BLD AUTO: 2.42 10*3/MM3 (ref 0.7–3.1)
LYMPHOCYTES NFR BLD AUTO: 27.3 % (ref 19.6–45.3)
MCH RBC QN AUTO: 29.8 PG (ref 26.6–33)
MCHC RBC AUTO-ENTMCNC: 31.6 G/DL (ref 31.5–35.7)
MCV RBC AUTO: 94.1 FL (ref 79–97)
MONOCYTES # BLD AUTO: 0.93 10*3/MM3 (ref 0.1–0.9)
MONOCYTES NFR BLD AUTO: 10.5 % (ref 5–12)
NEUTROPHILS NFR BLD AUTO: 5.07 10*3/MM3 (ref 1.7–7)
NEUTROPHILS NFR BLD AUTO: 57.1 % (ref 42.7–76)
NRBC BLD AUTO-RTO: 0 /100 WBC (ref 0–0.2)
PLATELET # BLD AUTO: 267 10*3/MM3 (ref 140–450)
PMV BLD AUTO: 10 FL (ref 6–12)
POTASSIUM SERPL-SCNC: 4 MMOL/L (ref 3.5–5.2)
PROT SERPL-MCNC: 7 G/DL (ref 6–8.5)
RBC # BLD AUTO: 3.73 10*6/MM3 (ref 4.14–5.8)
SODIUM SERPL-SCNC: 138 MMOL/L (ref 136–145)
WBC NRBC COR # BLD: 8.87 10*3/MM3 (ref 3.4–10.8)

## 2022-09-23 PROCEDURE — 99232 SBSQ HOSP IP/OBS MODERATE 35: CPT | Performed by: INTERNAL MEDICINE

## 2022-09-23 PROCEDURE — 85025 COMPLETE CBC W/AUTO DIFF WBC: CPT | Performed by: PHYSICIAN ASSISTANT

## 2022-09-23 PROCEDURE — 86140 C-REACTIVE PROTEIN: CPT | Performed by: PHYSICIAN ASSISTANT

## 2022-09-23 PROCEDURE — 80053 COMPREHEN METABOLIC PANEL: CPT | Performed by: PHYSICIAN ASSISTANT

## 2022-09-25 ENCOUNTER — OUTSIDE FACILITY SERVICE (OUTPATIENT)
Dept: INFECTIOUS DISEASES | Facility: CLINIC | Age: 42
End: 2022-09-25

## 2022-09-25 PROCEDURE — 99232 SBSQ HOSP IP/OBS MODERATE 35: CPT | Performed by: PHYSICIAN ASSISTANT

## 2022-09-26 ENCOUNTER — OUTSIDE FACILITY SERVICE (OUTPATIENT)
Dept: INFECTIOUS DISEASES | Facility: CLINIC | Age: 42
End: 2022-09-26

## 2022-09-26 LAB
ALBUMIN SERPL-MCNC: 3.09 G/DL (ref 3.5–5.2)
ALBUMIN/GLOB SERPL: 0.7 G/DL
ALP SERPL-CCNC: 137 U/L (ref 39–117)
ALT SERPL W P-5'-P-CCNC: 98 U/L (ref 1–41)
ANION GAP SERPL CALCULATED.3IONS-SCNC: 10.9 MMOL/L (ref 5–15)
AST SERPL-CCNC: 67 U/L (ref 1–40)
BASOPHILS # BLD AUTO: 0.07 10*3/MM3 (ref 0–0.2)
BASOPHILS NFR BLD AUTO: 0.7 % (ref 0–1.5)
BILIRUB SERPL-MCNC: 0.3 MG/DL (ref 0–1.2)
BUN SERPL-MCNC: 15 MG/DL (ref 6–20)
BUN/CREAT SERPL: 17.4 (ref 7–25)
CALCIUM SPEC-SCNC: 9.6 MG/DL (ref 8.6–10.5)
CHLORIDE SERPL-SCNC: 102 MMOL/L (ref 98–107)
CO2 SERPL-SCNC: 23.1 MMOL/L (ref 22–29)
CREAT SERPL-MCNC: 0.86 MG/DL (ref 0.76–1.27)
CRP SERPL-MCNC: 3.36 MG/DL (ref 0–0.5)
DEPRECATED RDW RBC AUTO: 45.6 FL (ref 37–54)
EGFRCR SERPLBLD CKD-EPI 2021: 112.3 ML/MIN/1.73
EOSINOPHIL # BLD AUTO: 0.56 10*3/MM3 (ref 0–0.4)
EOSINOPHIL NFR BLD AUTO: 5.7 % (ref 0.3–6.2)
ERYTHROCYTE [DISTWIDTH] IN BLOOD BY AUTOMATED COUNT: 13.3 % (ref 12.3–15.4)
GLOBULIN UR ELPH-MCNC: 4.5 GM/DL
GLUCOSE SERPL-MCNC: 104 MG/DL (ref 65–99)
HCT VFR BLD AUTO: 37.4 % (ref 37.5–51)
HGB BLD-MCNC: 12.3 G/DL (ref 13–17.7)
IMM GRANULOCYTES # BLD AUTO: 0.07 10*3/MM3 (ref 0–0.05)
IMM GRANULOCYTES NFR BLD AUTO: 0.7 % (ref 0–0.5)
LYMPHOCYTES # BLD AUTO: 2.61 10*3/MM3 (ref 0.7–3.1)
LYMPHOCYTES NFR BLD AUTO: 26.4 % (ref 19.6–45.3)
MCH RBC QN AUTO: 30.6 PG (ref 26.6–33)
MCHC RBC AUTO-ENTMCNC: 32.9 G/DL (ref 31.5–35.7)
MCV RBC AUTO: 93 FL (ref 79–97)
MONOCYTES # BLD AUTO: 1.06 10*3/MM3 (ref 0.1–0.9)
MONOCYTES NFR BLD AUTO: 10.7 % (ref 5–12)
NEUTROPHILS NFR BLD AUTO: 5.51 10*3/MM3 (ref 1.7–7)
NEUTROPHILS NFR BLD AUTO: 55.8 % (ref 42.7–76)
NRBC BLD AUTO-RTO: 0 /100 WBC (ref 0–0.2)
PLATELET # BLD AUTO: 308 10*3/MM3 (ref 140–450)
PMV BLD AUTO: 8.6 FL (ref 6–12)
POTASSIUM SERPL-SCNC: 4.2 MMOL/L (ref 3.5–5.2)
PREALB SERPL-MCNC: 13.5 MG/DL (ref 20–40)
PROT SERPL-MCNC: 7.6 G/DL (ref 6–8.5)
RBC # BLD AUTO: 4.02 10*6/MM3 (ref 4.14–5.8)
SODIUM SERPL-SCNC: 136 MMOL/L (ref 136–145)
VANCOMYCIN TROUGH SERPL-MCNC: 12.3 MCG/ML (ref 5–20)
WBC NRBC COR # BLD: 9.88 10*3/MM3 (ref 3.4–10.8)

## 2022-09-26 PROCEDURE — 86140 C-REACTIVE PROTEIN: CPT | Performed by: PHYSICIAN ASSISTANT

## 2022-09-26 PROCEDURE — 80053 COMPREHEN METABOLIC PANEL: CPT | Performed by: PHYSICIAN ASSISTANT

## 2022-09-26 PROCEDURE — 87522 HEPATITIS C REVRS TRNSCRPJ: CPT | Performed by: PHYSICIAN ASSISTANT

## 2022-09-26 PROCEDURE — 84134 ASSAY OF PREALBUMIN: CPT | Performed by: PHYSICIAN ASSISTANT

## 2022-09-26 PROCEDURE — 85025 COMPLETE CBC W/AUTO DIFF WBC: CPT | Performed by: PHYSICIAN ASSISTANT

## 2022-09-26 PROCEDURE — 80202 ASSAY OF VANCOMYCIN: CPT | Performed by: INTERNAL MEDICINE

## 2022-09-26 PROCEDURE — 99232 SBSQ HOSP IP/OBS MODERATE 35: CPT | Performed by: INTERNAL MEDICINE

## 2022-09-27 ENCOUNTER — OUTSIDE FACILITY SERVICE (OUTPATIENT)
Dept: INFECTIOUS DISEASES | Facility: CLINIC | Age: 42
End: 2022-09-27

## 2022-09-27 PROCEDURE — 99233 SBSQ HOSP IP/OBS HIGH 50: CPT | Performed by: INTERNAL MEDICINE

## 2022-09-28 ENCOUNTER — OUTSIDE FACILITY SERVICE (OUTPATIENT)
Dept: INFECTIOUS DISEASES | Facility: CLINIC | Age: 42
End: 2022-09-28

## 2022-09-28 LAB
ALBUMIN SERPL-MCNC: 3.13 G/DL (ref 3.5–5.2)
ALBUMIN/GLOB SERPL: 0.8 G/DL
ALP SERPL-CCNC: 135 U/L (ref 39–117)
ALT SERPL W P-5'-P-CCNC: 118 U/L (ref 1–41)
ANION GAP SERPL CALCULATED.3IONS-SCNC: 11.3 MMOL/L (ref 5–15)
AST SERPL-CCNC: 83 U/L (ref 1–40)
BASOPHILS # BLD AUTO: 0.07 10*3/MM3 (ref 0–0.2)
BASOPHILS NFR BLD AUTO: 0.8 % (ref 0–1.5)
BILIRUB SERPL-MCNC: 0.4 MG/DL (ref 0–1.2)
BUN SERPL-MCNC: 13 MG/DL (ref 6–20)
BUN/CREAT SERPL: 15.7 (ref 7–25)
CALCIUM SPEC-SCNC: 9.3 MG/DL (ref 8.6–10.5)
CHLORIDE SERPL-SCNC: 101 MMOL/L (ref 98–107)
CO2 SERPL-SCNC: 22.7 MMOL/L (ref 22–29)
CREAT SERPL-MCNC: 0.83 MG/DL (ref 0.76–1.27)
CRP SERPL-MCNC: 2.87 MG/DL (ref 0–0.5)
DEPRECATED RDW RBC AUTO: 46.3 FL (ref 37–54)
EGFRCR SERPLBLD CKD-EPI 2021: 113.5 ML/MIN/1.73
EOSINOPHIL # BLD AUTO: 0.39 10*3/MM3 (ref 0–0.4)
EOSINOPHIL NFR BLD AUTO: 4.2 % (ref 0.3–6.2)
ERYTHROCYTE [DISTWIDTH] IN BLOOD BY AUTOMATED COUNT: 13.5 % (ref 12.3–15.4)
GLOBULIN UR ELPH-MCNC: 3.9 GM/DL
GLUCOSE SERPL-MCNC: 106 MG/DL (ref 65–99)
HCT VFR BLD AUTO: 36.6 % (ref 37.5–51)
HCV RNA SERPL NAA+PROBE-ACNC: NORMAL IU/ML
HCV RNA SERPL NAA+PROBE-LOG IU: 6.71 LOG10 IU/ML
HGB BLD-MCNC: 11.8 G/DL (ref 13–17.7)
IMM GRANULOCYTES # BLD AUTO: 0.09 10*3/MM3 (ref 0–0.05)
IMM GRANULOCYTES NFR BLD AUTO: 1 % (ref 0–0.5)
LYMPHOCYTES # BLD AUTO: 2.64 10*3/MM3 (ref 0.7–3.1)
LYMPHOCYTES NFR BLD AUTO: 28.4 % (ref 19.6–45.3)
MCH RBC QN AUTO: 30.4 PG (ref 26.6–33)
MCHC RBC AUTO-ENTMCNC: 32.2 G/DL (ref 31.5–35.7)
MCV RBC AUTO: 94.3 FL (ref 79–97)
MONOCYTES # BLD AUTO: 0.93 10*3/MM3 (ref 0.1–0.9)
MONOCYTES NFR BLD AUTO: 10 % (ref 5–12)
NEUTROPHILS NFR BLD AUTO: 5.17 10*3/MM3 (ref 1.7–7)
NEUTROPHILS NFR BLD AUTO: 55.6 % (ref 42.7–76)
NRBC BLD AUTO-RTO: 0 /100 WBC (ref 0–0.2)
PLATELET # BLD AUTO: 335 10*3/MM3 (ref 140–450)
PMV BLD AUTO: 8.9 FL (ref 6–12)
POTASSIUM SERPL-SCNC: 3.7 MMOL/L (ref 3.5–5.2)
PROT SERPL-MCNC: 7 G/DL (ref 6–8.5)
RBC # BLD AUTO: 3.88 10*6/MM3 (ref 4.14–5.8)
SODIUM SERPL-SCNC: 135 MMOL/L (ref 136–145)
TEST INFORMATION: NORMAL
WBC NRBC COR # BLD: 9.29 10*3/MM3 (ref 3.4–10.8)

## 2022-09-28 PROCEDURE — 86140 C-REACTIVE PROTEIN: CPT | Performed by: PHYSICIAN ASSISTANT

## 2022-09-28 PROCEDURE — 85025 COMPLETE CBC W/AUTO DIFF WBC: CPT | Performed by: PHYSICIAN ASSISTANT

## 2022-09-28 PROCEDURE — 99232 SBSQ HOSP IP/OBS MODERATE 35: CPT | Performed by: INTERNAL MEDICINE

## 2022-09-28 PROCEDURE — 80053 COMPREHEN METABOLIC PANEL: CPT | Performed by: PHYSICIAN ASSISTANT

## 2022-09-29 ENCOUNTER — OUTSIDE FACILITY SERVICE (OUTPATIENT)
Dept: INFECTIOUS DISEASES | Facility: CLINIC | Age: 42
End: 2022-09-29

## 2022-09-29 PROCEDURE — 99232 SBSQ HOSP IP/OBS MODERATE 35: CPT | Performed by: INTERNAL MEDICINE

## 2022-09-30 ENCOUNTER — OUTSIDE FACILITY SERVICE (OUTPATIENT)
Dept: INFECTIOUS DISEASES | Facility: CLINIC | Age: 42
End: 2022-09-30

## 2022-09-30 LAB
ALBUMIN SERPL-MCNC: 3.27 G/DL (ref 3.5–5.2)
ALBUMIN/GLOB SERPL: 0.8 G/DL
ALP SERPL-CCNC: 138 U/L (ref 39–117)
ALT SERPL W P-5'-P-CCNC: 124 U/L (ref 1–41)
ANION GAP SERPL CALCULATED.3IONS-SCNC: 11.4 MMOL/L (ref 5–15)
AST SERPL-CCNC: 79 U/L (ref 1–40)
BASOPHILS # BLD AUTO: 0.07 10*3/MM3 (ref 0–0.2)
BASOPHILS NFR BLD AUTO: 0.7 % (ref 0–1.5)
BILIRUB SERPL-MCNC: 0.4 MG/DL (ref 0–1.2)
BUN SERPL-MCNC: 15 MG/DL (ref 6–20)
BUN/CREAT SERPL: 20.8 (ref 7–25)
CALCIUM SPEC-SCNC: 9.6 MG/DL (ref 8.6–10.5)
CHLORIDE SERPL-SCNC: 107 MMOL/L (ref 98–107)
CO2 SERPL-SCNC: 21.6 MMOL/L (ref 22–29)
CREAT SERPL-MCNC: 0.72 MG/DL (ref 0.76–1.27)
CRP SERPL-MCNC: 3.58 MG/DL (ref 0–0.5)
DEPRECATED RDW RBC AUTO: 45.3 FL (ref 37–54)
EGFRCR SERPLBLD CKD-EPI 2021: 118.4 ML/MIN/1.73
EOSINOPHIL # BLD AUTO: 0.22 10*3/MM3 (ref 0–0.4)
EOSINOPHIL NFR BLD AUTO: 2.3 % (ref 0.3–6.2)
ERYTHROCYTE [DISTWIDTH] IN BLOOD BY AUTOMATED COUNT: 13.3 % (ref 12.3–15.4)
GLOBULIN UR ELPH-MCNC: 4.3 GM/DL
GLUCOSE SERPL-MCNC: 98 MG/DL (ref 65–99)
HCT VFR BLD AUTO: 35.9 % (ref 37.5–51)
HGB BLD-MCNC: 11.6 G/DL (ref 13–17.7)
IMM GRANULOCYTES # BLD AUTO: 0.06 10*3/MM3 (ref 0–0.05)
IMM GRANULOCYTES NFR BLD AUTO: 0.6 % (ref 0–0.5)
LYMPHOCYTES # BLD AUTO: 2.31 10*3/MM3 (ref 0.7–3.1)
LYMPHOCYTES NFR BLD AUTO: 23.8 % (ref 19.6–45.3)
MCH RBC QN AUTO: 30.1 PG (ref 26.6–33)
MCHC RBC AUTO-ENTMCNC: 32.3 G/DL (ref 31.5–35.7)
MCV RBC AUTO: 93 FL (ref 79–97)
MONOCYTES # BLD AUTO: 1.24 10*3/MM3 (ref 0.1–0.9)
MONOCYTES NFR BLD AUTO: 12.8 % (ref 5–12)
NEUTROPHILS NFR BLD AUTO: 5.81 10*3/MM3 (ref 1.7–7)
NEUTROPHILS NFR BLD AUTO: 59.8 % (ref 42.7–76)
NRBC BLD AUTO-RTO: 0 /100 WBC (ref 0–0.2)
PLATELET # BLD AUTO: 318 10*3/MM3 (ref 140–450)
PMV BLD AUTO: 8.8 FL (ref 6–12)
POTASSIUM SERPL-SCNC: 3.9 MMOL/L (ref 3.5–5.2)
PROT SERPL-MCNC: 7.6 G/DL (ref 6–8.5)
RBC # BLD AUTO: 3.86 10*6/MM3 (ref 4.14–5.8)
SODIUM SERPL-SCNC: 140 MMOL/L (ref 136–145)
WBC NRBC COR # BLD: 9.71 10*3/MM3 (ref 3.4–10.8)

## 2022-09-30 PROCEDURE — 85025 COMPLETE CBC W/AUTO DIFF WBC: CPT | Performed by: PHYSICIAN ASSISTANT

## 2022-09-30 PROCEDURE — 80053 COMPREHEN METABOLIC PANEL: CPT | Performed by: PHYSICIAN ASSISTANT

## 2022-09-30 PROCEDURE — 99232 SBSQ HOSP IP/OBS MODERATE 35: CPT | Performed by: NURSE PRACTITIONER

## 2022-09-30 PROCEDURE — 86140 C-REACTIVE PROTEIN: CPT | Performed by: PHYSICIAN ASSISTANT

## 2022-10-03 ENCOUNTER — APPOINTMENT (OUTPATIENT)
Dept: GENERAL RADIOLOGY | Facility: HOSPITAL | Age: 42
End: 2022-10-03

## 2022-10-03 ENCOUNTER — OUTSIDE FACILITY SERVICE (OUTPATIENT)
Dept: INFECTIOUS DISEASES | Facility: CLINIC | Age: 42
End: 2022-10-03

## 2022-10-03 LAB
ALBUMIN SERPL-MCNC: 3.18 G/DL (ref 3.5–5.2)
ALBUMIN/GLOB SERPL: 0.6 G/DL
ALP SERPL-CCNC: 144 U/L (ref 39–117)
ALT SERPL W P-5'-P-CCNC: 162 U/L (ref 1–41)
ANION GAP SERPL CALCULATED.3IONS-SCNC: 13.4 MMOL/L (ref 5–15)
AST SERPL-CCNC: 100 U/L (ref 1–40)
BASOPHILS # BLD AUTO: 0.06 10*3/MM3 (ref 0–0.2)
BASOPHILS NFR BLD AUTO: 0.5 % (ref 0–1.5)
BILIRUB SERPL-MCNC: 0.4 MG/DL (ref 0–1.2)
BUN SERPL-MCNC: 11 MG/DL (ref 6–20)
BUN/CREAT SERPL: 13.4 (ref 7–25)
CALCIUM SPEC-SCNC: 10 MG/DL (ref 8.6–10.5)
CHLORIDE SERPL-SCNC: 102 MMOL/L (ref 98–107)
CO2 SERPL-SCNC: 20.6 MMOL/L (ref 22–29)
CREAT SERPL-MCNC: 0.82 MG/DL (ref 0.76–1.27)
CRP SERPL-MCNC: 7.03 MG/DL (ref 0–0.5)
DEPRECATED RDW RBC AUTO: 46.8 FL (ref 37–54)
EGFRCR SERPLBLD CKD-EPI 2021: 113.9 ML/MIN/1.73
EOSINOPHIL # BLD AUTO: 0.25 10*3/MM3 (ref 0–0.4)
EOSINOPHIL NFR BLD AUTO: 2.2 % (ref 0.3–6.2)
ERYTHROCYTE [DISTWIDTH] IN BLOOD BY AUTOMATED COUNT: 13.2 % (ref 12.3–15.4)
GLOBULIN UR ELPH-MCNC: 5 GM/DL
GLUCOSE SERPL-MCNC: 116 MG/DL (ref 65–99)
HCT VFR BLD AUTO: 41.8 % (ref 37.5–51)
HGB BLD-MCNC: 12.9 G/DL (ref 13–17.7)
IMM GRANULOCYTES # BLD AUTO: 0.06 10*3/MM3 (ref 0–0.05)
IMM GRANULOCYTES NFR BLD AUTO: 0.5 % (ref 0–0.5)
LYMPHOCYTES # BLD AUTO: 2.81 10*3/MM3 (ref 0.7–3.1)
LYMPHOCYTES NFR BLD AUTO: 24.6 % (ref 19.6–45.3)
MCH RBC QN AUTO: 29.8 PG (ref 26.6–33)
MCHC RBC AUTO-ENTMCNC: 30.9 G/DL (ref 31.5–35.7)
MCV RBC AUTO: 96.5 FL (ref 79–97)
MONOCYTES # BLD AUTO: 1.12 10*3/MM3 (ref 0.1–0.9)
MONOCYTES NFR BLD AUTO: 9.8 % (ref 5–12)
NEUTROPHILS NFR BLD AUTO: 62.4 % (ref 42.7–76)
NEUTROPHILS NFR BLD AUTO: 7.12 10*3/MM3 (ref 1.7–7)
NRBC BLD AUTO-RTO: 0 /100 WBC (ref 0–0.2)
PLATELET # BLD AUTO: 282 10*3/MM3 (ref 140–450)
PMV BLD AUTO: 8.6 FL (ref 6–12)
POTASSIUM SERPL-SCNC: 4 MMOL/L (ref 3.5–5.2)
PREALB SERPL-MCNC: 11 MG/DL (ref 20–40)
PROT SERPL-MCNC: 8.2 G/DL (ref 6–8.5)
RBC # BLD AUTO: 4.33 10*6/MM3 (ref 4.14–5.8)
SODIUM SERPL-SCNC: 136 MMOL/L (ref 136–145)
VANCOMYCIN TROUGH SERPL-MCNC: 12.8 MCG/ML (ref 5–20)
WBC NRBC COR # BLD: 11.42 10*3/MM3 (ref 3.4–10.8)

## 2022-10-03 PROCEDURE — 80202 ASSAY OF VANCOMYCIN: CPT | Performed by: INTERNAL MEDICINE

## 2022-10-03 PROCEDURE — 71045 X-RAY EXAM CHEST 1 VIEW: CPT | Performed by: RADIOLOGY

## 2022-10-03 PROCEDURE — 84134 ASSAY OF PREALBUMIN: CPT | Performed by: PHYSICIAN ASSISTANT

## 2022-10-03 PROCEDURE — 99232 SBSQ HOSP IP/OBS MODERATE 35: CPT | Performed by: NURSE PRACTITIONER

## 2022-10-03 PROCEDURE — 71045 X-RAY EXAM CHEST 1 VIEW: CPT

## 2022-10-03 PROCEDURE — 87040 BLOOD CULTURE FOR BACTERIA: CPT | Performed by: NURSE PRACTITIONER

## 2022-10-03 PROCEDURE — 86140 C-REACTIVE PROTEIN: CPT | Performed by: PHYSICIAN ASSISTANT

## 2022-10-03 PROCEDURE — 80053 COMPREHEN METABOLIC PANEL: CPT | Performed by: PHYSICIAN ASSISTANT

## 2022-10-03 PROCEDURE — 85025 COMPLETE CBC W/AUTO DIFF WBC: CPT | Performed by: PHYSICIAN ASSISTANT

## 2022-10-04 ENCOUNTER — OUTSIDE FACILITY SERVICE (OUTPATIENT)
Dept: INFECTIOUS DISEASES | Facility: CLINIC | Age: 42
End: 2022-10-04

## 2022-10-04 PROCEDURE — 99232 SBSQ HOSP IP/OBS MODERATE 35: CPT | Performed by: INTERNAL MEDICINE

## 2022-10-05 ENCOUNTER — OUTSIDE FACILITY SERVICE (OUTPATIENT)
Dept: INFECTIOUS DISEASES | Facility: CLINIC | Age: 42
End: 2022-10-05

## 2022-10-05 ENCOUNTER — APPOINTMENT (OUTPATIENT)
Dept: ULTRASOUND IMAGING | Facility: HOSPITAL | Age: 42
End: 2022-10-05

## 2022-10-05 LAB
ALBUMIN SERPL-MCNC: 2.84 G/DL (ref 3.5–5.2)
ALBUMIN/GLOB SERPL: 0.6 G/DL
ALP SERPL-CCNC: 128 U/L (ref 39–117)
ALT SERPL W P-5'-P-CCNC: 137 U/L (ref 1–41)
AMPHET+METHAMPHET UR QL: NEGATIVE
AMPHETAMINES UR QL: NEGATIVE
ANION GAP SERPL CALCULATED.3IONS-SCNC: 12.1 MMOL/L (ref 5–15)
AST SERPL-CCNC: 88 U/L (ref 1–40)
BARBITURATES UR QL SCN: NEGATIVE
BASOPHILS # BLD AUTO: 0.06 10*3/MM3 (ref 0–0.2)
BASOPHILS NFR BLD AUTO: 0.5 % (ref 0–1.5)
BENZODIAZ UR QL SCN: NEGATIVE
BILIRUB SERPL-MCNC: 0.4 MG/DL (ref 0–1.2)
BILIRUB UR QL STRIP: NEGATIVE
BUN SERPL-MCNC: 11 MG/DL (ref 6–20)
BUN/CREAT SERPL: 12.9 (ref 7–25)
BUPRENORPHINE SERPL-MCNC: NEGATIVE NG/ML
CALCIUM SPEC-SCNC: 9.2 MG/DL (ref 8.6–10.5)
CANNABINOIDS SERPL QL: NEGATIVE
CHLORIDE SERPL-SCNC: 102 MMOL/L (ref 98–107)
CLARITY UR: CLEAR
CO2 SERPL-SCNC: 20.9 MMOL/L (ref 22–29)
COCAINE UR QL: NEGATIVE
COLOR UR: YELLOW
CREAT SERPL-MCNC: 0.85 MG/DL (ref 0.76–1.27)
CRP SERPL-MCNC: 8.61 MG/DL (ref 0–0.5)
DEPRECATED RDW RBC AUTO: 44.9 FL (ref 37–54)
EGFRCR SERPLBLD CKD-EPI 2021: 112.7 ML/MIN/1.73
EOSINOPHIL # BLD AUTO: 0.24 10*3/MM3 (ref 0–0.4)
EOSINOPHIL NFR BLD AUTO: 1.9 % (ref 0.3–6.2)
ERYTHROCYTE [DISTWIDTH] IN BLOOD BY AUTOMATED COUNT: 13.2 % (ref 12.3–15.4)
GLOBULIN UR ELPH-MCNC: 4.5 GM/DL
GLUCOSE SERPL-MCNC: 113 MG/DL (ref 65–99)
GLUCOSE UR STRIP-MCNC: NEGATIVE MG/DL
HCT VFR BLD AUTO: 35.6 % (ref 37.5–51)
HGB BLD-MCNC: 11.4 G/DL (ref 13–17.7)
HGB UR QL STRIP.AUTO: NEGATIVE
IMM GRANULOCYTES # BLD AUTO: 0.06 10*3/MM3 (ref 0–0.05)
IMM GRANULOCYTES NFR BLD AUTO: 0.5 % (ref 0–0.5)
KETONES UR QL STRIP: NEGATIVE
LEUKOCYTE ESTERASE UR QL STRIP.AUTO: NEGATIVE
LYMPHOCYTES # BLD AUTO: 2.79 10*3/MM3 (ref 0.7–3.1)
LYMPHOCYTES NFR BLD AUTO: 22.6 % (ref 19.6–45.3)
MCH RBC QN AUTO: 29.6 PG (ref 26.6–33)
MCHC RBC AUTO-ENTMCNC: 32 G/DL (ref 31.5–35.7)
MCV RBC AUTO: 92.5 FL (ref 79–97)
METHADONE UR QL SCN: NEGATIVE
MONOCYTES # BLD AUTO: 1.19 10*3/MM3 (ref 0.1–0.9)
MONOCYTES NFR BLD AUTO: 9.7 % (ref 5–12)
NEUTROPHILS NFR BLD AUTO: 64.8 % (ref 42.7–76)
NEUTROPHILS NFR BLD AUTO: 7.99 10*3/MM3 (ref 1.7–7)
NITRITE UR QL STRIP: NEGATIVE
NRBC BLD AUTO-RTO: 0 /100 WBC (ref 0–0.2)
OPIATES UR QL: NEGATIVE
OXYCODONE UR QL SCN: POSITIVE
PCP UR QL SCN: NEGATIVE
PH UR STRIP.AUTO: <=5 [PH] (ref 5–8)
PLATELET # BLD AUTO: 318 10*3/MM3 (ref 140–450)
PMV BLD AUTO: 8.9 FL (ref 6–12)
POTASSIUM SERPL-SCNC: 3.9 MMOL/L (ref 3.5–5.2)
PROPOXYPH UR QL: NEGATIVE
PROT SERPL-MCNC: 7.3 G/DL (ref 6–8.5)
PROT UR QL STRIP: NEGATIVE
RBC # BLD AUTO: 3.85 10*6/MM3 (ref 4.14–5.8)
SODIUM SERPL-SCNC: 135 MMOL/L (ref 136–145)
SP GR UR STRIP: 1.02 (ref 1–1.03)
TRICYCLICS UR QL SCN: NEGATIVE
UROBILINOGEN UR QL STRIP: NORMAL
WBC NRBC COR # BLD: 12.33 10*3/MM3 (ref 3.4–10.8)

## 2022-10-05 PROCEDURE — 99233 SBSQ HOSP IP/OBS HIGH 50: CPT | Performed by: INTERNAL MEDICINE

## 2022-10-05 PROCEDURE — 80306 DRUG TEST PRSMV INSTRMNT: CPT | Performed by: INTERNAL MEDICINE

## 2022-10-05 PROCEDURE — 81003 URINALYSIS AUTO W/O SCOPE: CPT | Performed by: INTERNAL MEDICINE

## 2022-10-05 PROCEDURE — 93970 EXTREMITY STUDY: CPT

## 2022-10-05 PROCEDURE — 86140 C-REACTIVE PROTEIN: CPT | Performed by: PHYSICIAN ASSISTANT

## 2022-10-05 PROCEDURE — 80053 COMPREHEN METABOLIC PANEL: CPT | Performed by: PHYSICIAN ASSISTANT

## 2022-10-05 PROCEDURE — 93970 EXTREMITY STUDY: CPT | Performed by: RADIOLOGY

## 2022-10-05 PROCEDURE — 85025 COMPLETE CBC W/AUTO DIFF WBC: CPT | Performed by: PHYSICIAN ASSISTANT

## 2022-10-06 ENCOUNTER — OUTSIDE FACILITY SERVICE (OUTPATIENT)
Dept: INFECTIOUS DISEASES | Facility: CLINIC | Age: 42
End: 2022-10-06

## 2022-10-06 PROCEDURE — 99232 SBSQ HOSP IP/OBS MODERATE 35: CPT | Performed by: INTERNAL MEDICINE

## 2022-10-07 ENCOUNTER — OUTSIDE FACILITY SERVICE (OUTPATIENT)
Dept: INFECTIOUS DISEASES | Facility: CLINIC | Age: 42
End: 2022-10-07

## 2022-10-07 LAB
ALBUMIN SERPL-MCNC: 3.17 G/DL (ref 3.5–5.2)
ALBUMIN/GLOB SERPL: 0.7 G/DL
ALP SERPL-CCNC: 134 U/L (ref 39–117)
ALT SERPL W P-5'-P-CCNC: 165 U/L (ref 1–41)
ANION GAP SERPL CALCULATED.3IONS-SCNC: 10.1 MMOL/L (ref 5–15)
AST SERPL-CCNC: 116 U/L (ref 1–40)
BASOPHILS # BLD AUTO: 0.05 10*3/MM3 (ref 0–0.2)
BASOPHILS NFR BLD AUTO: 0.4 % (ref 0–1.5)
BILIRUB SERPL-MCNC: 0.4 MG/DL (ref 0–1.2)
BUN SERPL-MCNC: 14 MG/DL (ref 6–20)
BUN/CREAT SERPL: 18.4 (ref 7–25)
CALCIUM SPEC-SCNC: 9.5 MG/DL (ref 8.6–10.5)
CHLORIDE SERPL-SCNC: 101 MMOL/L (ref 98–107)
CO2 SERPL-SCNC: 22.9 MMOL/L (ref 22–29)
CREAT SERPL-MCNC: 0.76 MG/DL (ref 0.76–1.27)
CRP SERPL-MCNC: 7.81 MG/DL (ref 0–0.5)
DEPRECATED RDW RBC AUTO: 43.7 FL (ref 37–54)
EGFRCR SERPLBLD CKD-EPI 2021: 116.5 ML/MIN/1.73
EOSINOPHIL # BLD AUTO: 0.3 10*3/MM3 (ref 0–0.4)
EOSINOPHIL NFR BLD AUTO: 2.6 % (ref 0.3–6.2)
ERYTHROCYTE [DISTWIDTH] IN BLOOD BY AUTOMATED COUNT: 12.9 % (ref 12.3–15.4)
GLOBULIN UR ELPH-MCNC: 4.3 GM/DL
GLUCOSE SERPL-MCNC: 94 MG/DL (ref 65–99)
HCT VFR BLD AUTO: 34 % (ref 37.5–51)
HGB BLD-MCNC: 11 G/DL (ref 13–17.7)
IMM GRANULOCYTES # BLD AUTO: 0.06 10*3/MM3 (ref 0–0.05)
IMM GRANULOCYTES NFR BLD AUTO: 0.5 % (ref 0–0.5)
LYMPHOCYTES # BLD AUTO: 2.64 10*3/MM3 (ref 0.7–3.1)
LYMPHOCYTES NFR BLD AUTO: 22.8 % (ref 19.6–45.3)
MCH RBC QN AUTO: 29.8 PG (ref 26.6–33)
MCHC RBC AUTO-ENTMCNC: 32.4 G/DL (ref 31.5–35.7)
MCV RBC AUTO: 92.1 FL (ref 79–97)
MONOCYTES # BLD AUTO: 1.23 10*3/MM3 (ref 0.1–0.9)
MONOCYTES NFR BLD AUTO: 10.6 % (ref 5–12)
NEUTROPHILS NFR BLD AUTO: 63.1 % (ref 42.7–76)
NEUTROPHILS NFR BLD AUTO: 7.28 10*3/MM3 (ref 1.7–7)
NRBC BLD AUTO-RTO: 0 /100 WBC (ref 0–0.2)
PLATELET # BLD AUTO: 356 10*3/MM3 (ref 140–450)
PMV BLD AUTO: 9.3 FL (ref 6–12)
POTASSIUM SERPL-SCNC: 3.6 MMOL/L (ref 3.5–5.2)
PROT SERPL-MCNC: 7.5 G/DL (ref 6–8.5)
RBC # BLD AUTO: 3.69 10*6/MM3 (ref 4.14–5.8)
SODIUM SERPL-SCNC: 134 MMOL/L (ref 136–145)
WBC NRBC COR # BLD: 11.56 10*3/MM3 (ref 3.4–10.8)

## 2022-10-07 PROCEDURE — 99232 SBSQ HOSP IP/OBS MODERATE 35: CPT | Performed by: INTERNAL MEDICINE

## 2022-10-07 PROCEDURE — 85025 COMPLETE CBC W/AUTO DIFF WBC: CPT | Performed by: PHYSICIAN ASSISTANT

## 2022-10-07 PROCEDURE — 80053 COMPREHEN METABOLIC PANEL: CPT | Performed by: PHYSICIAN ASSISTANT

## 2022-10-07 PROCEDURE — 86140 C-REACTIVE PROTEIN: CPT | Performed by: PHYSICIAN ASSISTANT

## 2022-10-08 LAB
BACTERIA SPEC AEROBE CULT: NORMAL
BACTERIA SPEC AEROBE CULT: NORMAL

## 2023-07-21 PROBLEM — F17.200 CURRENT EVERY DAY SMOKER: Status: ACTIVE | Noted: 2023-07-21

## 2023-07-21 PROBLEM — S91.001S ANKLE WOUND, RIGHT, SEQUELA: Status: ACTIVE | Noted: 2023-07-21

## 2023-07-21 PROBLEM — T81.31XS SURGICAL WOUND DEHISCENCE, SEQUELA: Status: ACTIVE | Noted: 2023-07-21

## 2024-10-15 ENCOUNTER — TRANSCRIBE ORDERS (OUTPATIENT)
Dept: ADMINISTRATIVE | Facility: HOSPITAL | Age: 44
End: 2024-10-15
Payer: COMMERCIAL

## 2024-10-15 DIAGNOSIS — F17.200 CURRENT EVERY DAY SMOKER: Primary | ICD-10-CM

## 2024-10-15 DIAGNOSIS — L81.9 DISCOLORATION OF SKIN: ICD-10-CM

## 2024-10-21 ENCOUNTER — TRANSCRIBE ORDERS (OUTPATIENT)
Dept: ADMINISTRATIVE | Facility: HOSPITAL | Age: 44
End: 2024-10-21
Payer: COMMERCIAL

## 2024-10-21 DIAGNOSIS — F17.200 CURRENT EVERY DAY SMOKER: Primary | ICD-10-CM

## 2024-10-21 DIAGNOSIS — L81.9 DISCOLORATION OF SKIN: ICD-10-CM

## 2024-11-13 ENCOUNTER — HOSPITAL ENCOUNTER (OUTPATIENT)
Dept: ULTRASOUND IMAGING | Facility: HOSPITAL | Age: 44
Discharge: HOME | End: 2024-11-13
Payer: COMMERCIAL

## 2024-11-13 DIAGNOSIS — L81.9 DISCOLORATION OF SKIN: ICD-10-CM

## 2024-11-13 DIAGNOSIS — F17.200 CURRENT EVERY DAY SMOKER: ICD-10-CM

## 2024-11-14 ENCOUNTER — HOSPITAL ENCOUNTER (OUTPATIENT)
Dept: ULTRASOUND IMAGING | Facility: HOSPITAL | Age: 44
Discharge: HOME | End: 2024-11-14
Admitting: NURSE PRACTITIONER
Payer: COMMERCIAL

## 2024-11-14 PROCEDURE — 93922 UPR/L XTREMITY ART 2 LEVELS: CPT

## 2024-11-14 PROCEDURE — 93922 UPR/L XTREMITY ART 2 LEVELS: CPT | Performed by: RADIOLOGY

## (undated) DEVICE — SPNG GZ WOVN 4X4IN 12PLY 10/BX STRL

## (undated) DEVICE — DRSNG PAD ABD 8X10IN STRL

## (undated) DEVICE — BNDG ELAS CO-FLEX SLF ADHR 4IN5YD LF STRL

## (undated) DEVICE — PK EXTREM LOWR 70

## (undated) DEVICE — UNDERCAST PADDING: Brand: DEROYAL

## (undated) DEVICE — GLV SURG SENSICARE PI ORTHO SZ8 LF STRL

## (undated) DEVICE — HOLDER: Brand: DEROYAL

## (undated) DEVICE — BANDAGE,GAUZE,BULKEE II,4.5"X4.1YD,STRL: Brand: MEDLINE

## (undated) DEVICE — BANDAGE,GAUZE,BULKEE II,2.25"X3YD,STRL: Brand: MEDLINE

## (undated) DEVICE — DISPOSABLE TOURNIQUET CUFF SINGLE BLADDER, SINGLE PORT AND QUICK CONNECT CONNECTOR: Brand: COLOR CUFF

## (undated) DEVICE — TRY SKINPREP PVP SCRB W PAINT